# Patient Record
Sex: MALE | HISPANIC OR LATINO | Employment: UNEMPLOYED | URBAN - METROPOLITAN AREA
[De-identification: names, ages, dates, MRNs, and addresses within clinical notes are randomized per-mention and may not be internally consistent; named-entity substitution may affect disease eponyms.]

---

## 2020-08-15 ENCOUNTER — HOSPITAL ENCOUNTER (EMERGENCY)
Facility: HOSPITAL | Age: 60
Discharge: HOME | End: 2020-08-15
Attending: EMERGENCY MEDICINE | Admitting: EMERGENCY MEDICINE

## 2020-08-15 ENCOUNTER — APPOINTMENT (OUTPATIENT)
Dept: GENERAL RADIOLOGY | Facility: HOSPITAL | Age: 60
End: 2020-08-15

## 2020-08-15 VITALS
HEIGHT: 67 IN | OXYGEN SATURATION: 99 % | DIASTOLIC BLOOD PRESSURE: 99 MMHG | RESPIRATION RATE: 18 BRPM | BODY MASS INDEX: 26.68 KG/M2 | TEMPERATURE: 99.1 F | SYSTOLIC BLOOD PRESSURE: 142 MMHG | WEIGHT: 170 LBS | HEART RATE: 85 BPM

## 2020-08-15 DIAGNOSIS — R07.89 RIGHT-SIDED CHEST WALL PAIN: Primary | ICD-10-CM
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
## 2020-08-15 PROCEDURE — 99284 EMERGENCY DEPT VISIT MOD MDM: CPT

## 2020-08-15 PROCEDURE — 71101 X-RAY EXAM UNILAT RIBS/CHEST: CPT

## 2020-08-15 RX ORDER — IBUPROFEN 800 MG/1
800 TABLET ORAL EVERY 8 HOURS PRN
Qty: 30 TABLET | Refills: 0 | Status: SHIPPED | OUTPATIENT
Start: 2020-08-15

## 2020-08-15 RX ORDER — IBUPROFEN 800 MG/1
800 TABLET ORAL ONCE
Status: COMPLETED | OUTPATIENT
Start: 2020-08-15 | End: 2020-08-15

## 2020-08-15 RX ADMIN — IBUPROFEN 800 MG: 800 TABLET, FILM COATED ORAL at 19:24
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
## 2020-08-15 NOTE — ED NOTES
This RN wearing all appropriate PPE including face mask, gloves, and eye protection during any and all periods of contact w/ patient and at all times while in patient care areas; patient wearing mask at all times when staff is present in exam room.     Patricia Leung, RN  08/15/20 1932

## 2020-08-15 NOTE — ED NOTES
Patient was wearing facemask when RN entered the room and throughout our encounter. RN wearing PPE throughout all patient encounter including a face mask, goggles and gloves.      Patricia Walker RN  08/15/20 2030
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
## 2021-09-07 ENCOUNTER — APPOINTMENT (OUTPATIENT)
Dept: GENERAL RADIOLOGY | Facility: HOSPITAL | Age: 61
End: 2021-09-07

## 2021-09-07 ENCOUNTER — APPOINTMENT (OUTPATIENT)
Dept: MRI IMAGING | Facility: HOSPITAL | Age: 61
End: 2021-09-07

## 2021-09-07 ENCOUNTER — HOSPITAL ENCOUNTER (INPATIENT)
Facility: HOSPITAL | Age: 61
LOS: 4 days | Discharge: HOME | End: 2021-09-11
Attending: EMERGENCY MEDICINE | Admitting: INTERNAL MEDICINE

## 2021-09-07 ENCOUNTER — APPOINTMENT (OUTPATIENT)
Dept: CT IMAGING | Facility: HOSPITAL | Age: 61
End: 2021-09-07

## 2021-09-07 DIAGNOSIS — R47.01 APHASIA: Primary | ICD-10-CM

## 2021-09-07 DIAGNOSIS — R90.89 ABNORMAL CT OF BRAIN: ICD-10-CM

## 2021-09-07 PROBLEM — Z87.898 HISTORY OF SEIZURE: Status: ACTIVE | Noted: 2021-09-07

## 2021-09-07 PROBLEM — Z86.73 HISTORY OF STROKE: Status: ACTIVE | Noted: 2021-09-07

## 2021-09-07 PROBLEM — G83.84 TODD'S PARALYSIS: Status: ACTIVE | Noted: 2021-09-07

## 2021-09-07 PROBLEM — R56.9 SEIZURE: Status: ACTIVE | Noted: 2021-09-07

## 2021-09-07 PROBLEM — N17.9 AKI (ACUTE KIDNEY INJURY): Status: ACTIVE | Noted: 2021-09-07

## 2021-09-07 LAB
ABO GROUP BLD: NORMAL
ALBUMIN SERPL-MCNC: 4.4 G/DL (ref 3.5–5.2)
ALBUMIN/GLOB SERPL: 1.4 G/DL
ALP SERPL-CCNC: 80 U/L (ref 39–117)
ALT SERPL W P-5'-P-CCNC: 17 U/L (ref 1–41)
ANION GAP SERPL CALCULATED.3IONS-SCNC: 24.5 MMOL/L (ref 5–15)
APAP SERPL-MCNC: <5 MCG/ML (ref 0–30)
APTT PPP: 27.1 SECONDS (ref 22.7–35.4)
AST SERPL-CCNC: 19 U/L (ref 1–40)
BASOPHILS # BLD AUTO: 0.03 10*3/MM3 (ref 0–0.2)
BASOPHILS NFR BLD AUTO: 0.3 % (ref 0–1.5)
BILIRUB SERPL-MCNC: 0.2 MG/DL (ref 0–1.2)
BLD GP AB SCN SERPL QL: NEGATIVE
BUN SERPL-MCNC: 17 MG/DL (ref 8–23)
BUN/CREAT SERPL: 10.7 (ref 7–25)
CALCIUM SPEC-SCNC: 9.6 MG/DL (ref 8.6–10.5)
CHLORIDE SERPL-SCNC: 95 MMOL/L (ref 98–107)
CO2 SERPL-SCNC: 13.5 MMOL/L (ref 22–29)
CREAT SERPL-MCNC: 1.59 MG/DL (ref 0.76–1.27)
D-LACTATE SERPL-SCNC: 0.5 MMOL/L (ref 0.5–2)
DEPRECATED RDW RBC AUTO: 43.6 FL (ref 37–54)
EOSINOPHIL # BLD AUTO: 0.05 10*3/MM3 (ref 0–0.4)
EOSINOPHIL NFR BLD AUTO: 0.5 % (ref 0.3–6.2)
ERYTHROCYTE [DISTWIDTH] IN BLOOD BY AUTOMATED COUNT: 13.6 % (ref 12.3–15.4)
GFR SERPL CREATININE-BSD FRML MDRD: 45 ML/MIN/1.73
GFR SERPL CREATININE-BSD FRML MDRD: 54 ML/MIN/1.73
GLOBULIN UR ELPH-MCNC: 3.2 GM/DL
GLUCOSE SERPL-MCNC: 119 MG/DL (ref 65–99)
HCT VFR BLD AUTO: 39.4 % (ref 37.5–51)
HGB BLD-MCNC: 13.4 G/DL (ref 13–17.7)
HOLD SPECIMEN: NORMAL
HOLD SPECIMEN: NORMAL
IMM GRANULOCYTES # BLD AUTO: 0.05 10*3/MM3 (ref 0–0.05)
IMM GRANULOCYTES NFR BLD AUTO: 0.5 % (ref 0–0.5)
INR PPP: 1.08 (ref 0.9–1.1)
LYMPHOCYTES # BLD AUTO: 2.64 10*3/MM3 (ref 0.7–3.1)
LYMPHOCYTES NFR BLD AUTO: 26.9 % (ref 19.6–45.3)
MCH RBC QN AUTO: 29.9 PG (ref 26.6–33)
MCHC RBC AUTO-ENTMCNC: 34 G/DL (ref 31.5–35.7)
MCV RBC AUTO: 87.9 FL (ref 79–97)
MONOCYTES # BLD AUTO: 0.73 10*3/MM3 (ref 0.1–0.9)
MONOCYTES NFR BLD AUTO: 7.4 % (ref 5–12)
NEUTROPHILS NFR BLD AUTO: 6.32 10*3/MM3 (ref 1.7–7)
NEUTROPHILS NFR BLD AUTO: 64.4 % (ref 42.7–76)
NRBC BLD AUTO-RTO: 0 /100 WBC (ref 0–0.2)
PLATELET # BLD AUTO: 212 10*3/MM3 (ref 140–450)
PMV BLD AUTO: 10.1 FL (ref 6–12)
POTASSIUM SERPL-SCNC: 3.7 MMOL/L (ref 3.5–5.2)
PROT SERPL-MCNC: 7.6 G/DL (ref 6–8.5)
PROTHROMBIN TIME: 13.8 SECONDS (ref 11.7–14.2)
QT INTERVAL: 353 MS
RBC # BLD AUTO: 4.48 10*6/MM3 (ref 4.14–5.8)
RH BLD: POSITIVE
SALICYLATES SERPL-MCNC: <0.3 MG/DL
SARS-COV-2 RNA RESP QL NAA+PROBE: NOT DETECTED
SODIUM SERPL-SCNC: 133 MMOL/L (ref 136–145)
T&S EXPIRATION DATE: NORMAL
TROPONIN T SERPL-MCNC: <0.01 NG/ML (ref 0–0.03)
WBC # BLD AUTO: 9.82 10*3/MM3 (ref 3.4–10.8)
WHOLE BLOOD HOLD SPECIMEN: NORMAL
WHOLE BLOOD HOLD SPECIMEN: NORMAL

## 2021-09-07 PROCEDURE — 70498 CT ANGIOGRAPHY NECK: CPT

## 2021-09-07 PROCEDURE — 99254 IP/OBS CNSLTJ NEW/EST MOD 60: CPT | Performed by: PSYCHIATRY & NEUROLOGY

## 2021-09-07 PROCEDURE — 85610 PROTHROMBIN TIME: CPT | Performed by: EMERGENCY MEDICINE

## 2021-09-07 PROCEDURE — 70496 CT ANGIOGRAPHY HEAD: CPT

## 2021-09-07 PROCEDURE — 0042T HC CT CEREBRAL PERFUSION W/WO CONTRAST: CPT

## 2021-09-07 PROCEDURE — 4A03X5D MEASUREMENT OF ARTERIAL FLOW, INTRACRANIAL, EXTERNAL APPROACH: ICD-10-PCS | Performed by: EMERGENCY MEDICINE

## 2021-09-07 PROCEDURE — 85025 COMPLETE CBC W/AUTO DIFF WBC: CPT | Performed by: EMERGENCY MEDICINE

## 2021-09-07 PROCEDURE — 80143 DRUG ASSAY ACETAMINOPHEN: CPT | Performed by: EMERGENCY MEDICINE

## 2021-09-07 PROCEDURE — 86900 BLOOD TYPING SEROLOGIC ABO: CPT | Performed by: EMERGENCY MEDICINE

## 2021-09-07 PROCEDURE — 85730 THROMBOPLASTIN TIME PARTIAL: CPT | Performed by: EMERGENCY MEDICINE

## 2021-09-07 PROCEDURE — 84484 ASSAY OF TROPONIN QUANT: CPT | Performed by: EMERGENCY MEDICINE

## 2021-09-07 PROCEDURE — 82565 ASSAY OF CREATININE: CPT

## 2021-09-07 PROCEDURE — 25010000002 THIAMINE PER 100 MG: Performed by: PSYCHIATRY & NEUROLOGY

## 2021-09-07 PROCEDURE — 0 IOPAMIDOL PER 1 ML: Performed by: EMERGENCY MEDICINE

## 2021-09-07 PROCEDURE — 70553 MRI BRAIN STEM W/O & W/DYE: CPT

## 2021-09-07 PROCEDURE — A9577 INJ MULTIHANCE: HCPCS | Performed by: INTERNAL MEDICINE

## 2021-09-07 PROCEDURE — 86850 RBC ANTIBODY SCREEN: CPT | Performed by: EMERGENCY MEDICINE

## 2021-09-07 PROCEDURE — 93005 ELECTROCARDIOGRAM TRACING: CPT | Performed by: EMERGENCY MEDICINE

## 2021-09-07 PROCEDURE — 25010000003 LEVETIRACETAM IN NACL 0.75% 1000 MG/100ML SOLUTION: Performed by: EMERGENCY MEDICINE

## 2021-09-07 PROCEDURE — 86901 BLOOD TYPING SEROLOGIC RH(D): CPT | Performed by: EMERGENCY MEDICINE

## 2021-09-07 PROCEDURE — 83605 ASSAY OF LACTIC ACID: CPT | Performed by: EMERGENCY MEDICINE

## 2021-09-07 PROCEDURE — 0 GADOBENATE DIMEGLUMINE 529 MG/ML SOLUTION: Performed by: INTERNAL MEDICINE

## 2021-09-07 PROCEDURE — 80053 COMPREHEN METABOLIC PANEL: CPT | Performed by: EMERGENCY MEDICINE

## 2021-09-07 PROCEDURE — 25010000002 LORAZEPAM PER 2 MG: Performed by: EMERGENCY MEDICINE

## 2021-09-07 PROCEDURE — U0003 INFECTIOUS AGENT DETECTION BY NUCLEIC ACID (DNA OR RNA); SEVERE ACUTE RESPIRATORY SYNDROME CORONAVIRUS 2 (SARS-COV-2) (CORONAVIRUS DISEASE [COVID-19]), AMPLIFIED PROBE TECHNIQUE, MAKING USE OF HIGH THROUGHPUT TECHNOLOGIES AS DESCRIBED BY CMS-2020-01-R: HCPCS | Performed by: EMERGENCY MEDICINE

## 2021-09-07 PROCEDURE — 99285 EMERGENCY DEPT VISIT HI MDM: CPT

## 2021-09-07 PROCEDURE — 80179 DRUG ASSAY SALICYLATE: CPT | Performed by: EMERGENCY MEDICINE

## 2021-09-07 PROCEDURE — 71045 X-RAY EXAM CHEST 1 VIEW: CPT

## 2021-09-07 PROCEDURE — 93010 ELECTROCARDIOGRAM REPORT: CPT | Performed by: INTERNAL MEDICINE

## 2021-09-07 RX ORDER — ATORVASTATIN CALCIUM 80 MG/1
80 TABLET, FILM COATED ORAL NIGHTLY
Status: DISCONTINUED | OUTPATIENT
Start: 2021-09-07 | End: 2021-09-10

## 2021-09-07 RX ORDER — SODIUM CHLORIDE 0.9 % (FLUSH) 0.9 %
10 SYRINGE (ML) INJECTION EVERY 12 HOURS SCHEDULED
Status: DISCONTINUED | OUTPATIENT
Start: 2021-09-07 | End: 2021-09-11 | Stop reason: HOSPADM

## 2021-09-07 RX ORDER — ACETAMINOPHEN 160 MG/5ML
650 SOLUTION ORAL EVERY 4 HOURS PRN
Status: DISCONTINUED | OUTPATIENT
Start: 2021-09-07 | End: 2021-09-11 | Stop reason: HOSPADM

## 2021-09-07 RX ORDER — ACETAMINOPHEN 650 MG/1
650 SUPPOSITORY RECTAL EVERY 4 HOURS PRN
Status: DISCONTINUED | OUTPATIENT
Start: 2021-09-07 | End: 2021-09-11 | Stop reason: HOSPADM

## 2021-09-07 RX ORDER — LEVETIRACETAM 10 MG/ML
1000 INJECTION INTRAVASCULAR EVERY 12 HOURS SCHEDULED
Status: DISCONTINUED | OUTPATIENT
Start: 2021-09-08 | End: 2021-09-09 | Stop reason: SDUPTHER

## 2021-09-07 RX ORDER — SODIUM CHLORIDE 0.9 % (FLUSH) 0.9 %
10 SYRINGE (ML) INJECTION AS NEEDED
Status: DISCONTINUED | OUTPATIENT
Start: 2021-09-07 | End: 2021-09-11 | Stop reason: HOSPADM

## 2021-09-07 RX ORDER — ONDANSETRON 2 MG/ML
4 INJECTION INTRAMUSCULAR; INTRAVENOUS EVERY 6 HOURS PRN
Status: DISCONTINUED | OUTPATIENT
Start: 2021-09-07 | End: 2021-09-11 | Stop reason: HOSPADM

## 2021-09-07 RX ORDER — NITROGLYCERIN 0.4 MG/1
0.4 TABLET SUBLINGUAL
Status: DISCONTINUED | OUTPATIENT
Start: 2021-09-07 | End: 2021-09-11 | Stop reason: HOSPADM

## 2021-09-07 RX ORDER — LORAZEPAM 2 MG/ML
1 INJECTION INTRAMUSCULAR ONCE
Status: COMPLETED | OUTPATIENT
Start: 2021-09-07 | End: 2021-09-07

## 2021-09-07 RX ORDER — LEVETIRACETAM 10 MG/ML
1000 INJECTION INTRAVASCULAR EVERY 12 HOURS SCHEDULED
Status: DISCONTINUED | OUTPATIENT
Start: 2021-09-07 | End: 2021-09-10

## 2021-09-07 RX ORDER — SODIUM CHLORIDE 9 MG/ML
100 INJECTION, SOLUTION INTRAVENOUS CONTINUOUS
Status: DISCONTINUED | OUTPATIENT
Start: 2021-09-07 | End: 2021-09-10

## 2021-09-07 RX ORDER — LEVETIRACETAM 10 MG/ML
1000 INJECTION INTRAVASCULAR ONCE
Status: COMPLETED | OUTPATIENT
Start: 2021-09-07 | End: 2021-09-07

## 2021-09-07 RX ORDER — ACETAMINOPHEN 325 MG/1
650 TABLET ORAL EVERY 4 HOURS PRN
Status: DISCONTINUED | OUTPATIENT
Start: 2021-09-07 | End: 2021-09-11 | Stop reason: HOSPADM

## 2021-09-07 RX ADMIN — IOPAMIDOL 100 ML: 755 INJECTION, SOLUTION INTRAVENOUS at 16:28

## 2021-09-07 RX ADMIN — IOPAMIDOL 50 ML: 755 INJECTION, SOLUTION INTRAVENOUS at 16:27

## 2021-09-07 RX ADMIN — SODIUM CHLORIDE, PRESERVATIVE FREE 10 ML: 5 INJECTION INTRAVENOUS at 23:13

## 2021-09-07 RX ADMIN — THIAMINE HYDROCHLORIDE 100 MG: 100 INJECTION, SOLUTION INTRAMUSCULAR; INTRAVENOUS at 23:13

## 2021-09-07 RX ADMIN — SODIUM CHLORIDE, PRESERVATIVE FREE 10 ML: 5 INJECTION INTRAVENOUS at 23:14

## 2021-09-07 RX ADMIN — SODIUM CHLORIDE 100 ML/HR: 9 INJECTION, SOLUTION INTRAVENOUS at 23:12

## 2021-09-07 RX ADMIN — SODIUM CHLORIDE 1000 ML: 9 INJECTION, SOLUTION INTRAVENOUS at 17:04

## 2021-09-07 RX ADMIN — LORAZEPAM 1 MG: 2 INJECTION INTRAMUSCULAR; INTRAVENOUS at 17:03

## 2021-09-07 RX ADMIN — GADOBENATE DIMEGLUMINE 17 ML: 529 INJECTION, SOLUTION INTRAVENOUS at 21:38

## 2021-09-07 RX ADMIN — LEVETIRACETAM 1000 MG: 1000 INJECTION, SOLUTION INTRAVENOUS at 17:03

## 2021-09-08 LAB
ALBUMIN SERPL-MCNC: 3.6 G/DL (ref 3.5–5.2)
ALBUMIN/GLOB SERPL: 1.3 G/DL
ALP SERPL-CCNC: 67 U/L (ref 39–117)
ALT SERPL W P-5'-P-CCNC: 13 U/L (ref 1–41)
ANION GAP SERPL CALCULATED.3IONS-SCNC: 9.9 MMOL/L (ref 5–15)
AST SERPL-CCNC: 16 U/L (ref 1–40)
BASOPHILS # BLD AUTO: 0.02 10*3/MM3 (ref 0–0.2)
BASOPHILS NFR BLD AUTO: 0.3 % (ref 0–1.5)
BILIRUB SERPL-MCNC: 0.4 MG/DL (ref 0–1.2)
BUN SERPL-MCNC: 15 MG/DL (ref 8–23)
BUN/CREAT SERPL: 13 (ref 7–25)
CALCIUM SPEC-SCNC: 8.4 MG/DL (ref 8.6–10.5)
CHLORIDE SERPL-SCNC: 105 MMOL/L (ref 98–107)
CHOLEST SERPL-MCNC: 179 MG/DL (ref 0–200)
CO2 SERPL-SCNC: 23.1 MMOL/L (ref 22–29)
CREAT SERPL-MCNC: 1.15 MG/DL (ref 0.76–1.27)
CRP SERPL-MCNC: 0.48 MG/DL (ref 0–0.5)
D-LACTATE SERPL-SCNC: 1 MMOL/L (ref 0.5–2)
DEPRECATED RDW RBC AUTO: 46.2 FL (ref 37–54)
EOSINOPHIL # BLD AUTO: 0.05 10*3/MM3 (ref 0–0.4)
EOSINOPHIL NFR BLD AUTO: 0.9 % (ref 0.3–6.2)
ERYTHROCYTE [DISTWIDTH] IN BLOOD BY AUTOMATED COUNT: 13.8 % (ref 12.3–15.4)
GFR SERPL CREATININE-BSD FRML MDRD: 65 ML/MIN/1.73
GFR SERPL CREATININE-BSD FRML MDRD: 79 ML/MIN/1.73
GLOBULIN UR ELPH-MCNC: 2.8 GM/DL
GLUCOSE BLDC GLUCOMTR-MCNC: 126 MG/DL (ref 70–130)
GLUCOSE BLDC GLUCOMTR-MCNC: 152 MG/DL (ref 70–130)
GLUCOSE BLDC GLUCOMTR-MCNC: 96 MG/DL (ref 70–130)
GLUCOSE BLDC GLUCOMTR-MCNC: 99 MG/DL (ref 70–130)
GLUCOSE SERPL-MCNC: 85 MG/DL (ref 65–99)
HBA1C MFR BLD: 5.7 % (ref 4.8–5.6)
HCT VFR BLD AUTO: 38.7 % (ref 37.5–51)
HDLC SERPL-MCNC: 28 MG/DL (ref 40–60)
HGB BLD-MCNC: 12.4 G/DL (ref 13–17.7)
IMM GRANULOCYTES # BLD AUTO: 0.01 10*3/MM3 (ref 0–0.05)
IMM GRANULOCYTES NFR BLD AUTO: 0.2 % (ref 0–0.5)
LDLC SERPL CALC-MCNC: 118 MG/DL (ref 0–100)
LDLC/HDLC SERPL: 4.06 {RATIO}
LYMPHOCYTES # BLD AUTO: 1.17 10*3/MM3 (ref 0.7–3.1)
LYMPHOCYTES NFR BLD AUTO: 20.1 % (ref 19.6–45.3)
MCH RBC QN AUTO: 29 PG (ref 26.6–33)
MCHC RBC AUTO-ENTMCNC: 32 G/DL (ref 31.5–35.7)
MCV RBC AUTO: 90.4 FL (ref 79–97)
MONOCYTES # BLD AUTO: 0.46 10*3/MM3 (ref 0.1–0.9)
MONOCYTES NFR BLD AUTO: 7.9 % (ref 5–12)
NEUTROPHILS NFR BLD AUTO: 4.11 10*3/MM3 (ref 1.7–7)
NEUTROPHILS NFR BLD AUTO: 70.6 % (ref 42.7–76)
NRBC BLD AUTO-RTO: 0 /100 WBC (ref 0–0.2)
PLATELET # BLD AUTO: 189 10*3/MM3 (ref 140–450)
PMV BLD AUTO: 10.3 FL (ref 6–12)
POTASSIUM SERPL-SCNC: 3.8 MMOL/L (ref 3.5–5.2)
PROT SERPL-MCNC: 6.4 G/DL (ref 6–8.5)
RBC # BLD AUTO: 4.28 10*6/MM3 (ref 4.14–5.8)
SODIUM SERPL-SCNC: 138 MMOL/L (ref 136–145)
TRIGL SERPL-MCNC: 186 MG/DL (ref 0–150)
VLDLC SERPL-MCNC: 33 MG/DL (ref 5–40)
WBC # BLD AUTO: 5.82 10*3/MM3 (ref 3.4–10.8)

## 2021-09-08 PROCEDURE — 97110 THERAPEUTIC EXERCISES: CPT

## 2021-09-08 PROCEDURE — 97161 PT EVAL LOW COMPLEX 20 MIN: CPT

## 2021-09-08 PROCEDURE — 97165 OT EVAL LOW COMPLEX 30 MIN: CPT

## 2021-09-08 PROCEDURE — 25010000003 LEVETIRACETAM IN NACL 0.75% 1000 MG/100ML SOLUTION: Performed by: INTERNAL MEDICINE

## 2021-09-08 PROCEDURE — 36415 COLL VENOUS BLD VENIPUNCTURE: CPT | Performed by: INTERNAL MEDICINE

## 2021-09-08 PROCEDURE — 25010000002 THIAMINE PER 100 MG: Performed by: PSYCHIATRY & NEUROLOGY

## 2021-09-08 PROCEDURE — 92523 SPEECH SOUND LANG COMPREHEN: CPT

## 2021-09-08 PROCEDURE — 99254 IP/OBS CNSLTJ NEW/EST MOD 60: CPT | Performed by: NURSE PRACTITIONER

## 2021-09-08 PROCEDURE — 99233 SBSQ HOSP IP/OBS HIGH 50: CPT | Performed by: NURSE PRACTITIONER

## 2021-09-08 PROCEDURE — 85025 COMPLETE CBC W/AUTO DIFF WBC: CPT | Performed by: INTERNAL MEDICINE

## 2021-09-08 PROCEDURE — 80061 LIPID PANEL: CPT | Performed by: PSYCHIATRY & NEUROLOGY

## 2021-09-08 PROCEDURE — 82962 GLUCOSE BLOOD TEST: CPT

## 2021-09-08 PROCEDURE — 83605 ASSAY OF LACTIC ACID: CPT | Performed by: INTERNAL MEDICINE

## 2021-09-08 PROCEDURE — 97535 SELF CARE MNGMENT TRAINING: CPT

## 2021-09-08 PROCEDURE — 87040 BLOOD CULTURE FOR BACTERIA: CPT | Performed by: NURSE PRACTITIONER

## 2021-09-08 PROCEDURE — 80053 COMPREHEN METABOLIC PANEL: CPT | Performed by: INTERNAL MEDICINE

## 2021-09-08 PROCEDURE — 25010000003 LEVETIRACETAM IN NACL 0.75% 1000 MG/100ML SOLUTION: Performed by: PSYCHIATRY & NEUROLOGY

## 2021-09-08 PROCEDURE — 83036 HEMOGLOBIN GLYCOSYLATED A1C: CPT | Performed by: PSYCHIATRY & NEUROLOGY

## 2021-09-08 PROCEDURE — 86140 C-REACTIVE PROTEIN: CPT | Performed by: NURSE PRACTITIONER

## 2021-09-08 RX ADMIN — SODIUM CHLORIDE, PRESERVATIVE FREE 10 ML: 5 INJECTION INTRAVENOUS at 20:42

## 2021-09-08 RX ADMIN — SODIUM CHLORIDE, PRESERVATIVE FREE 10 ML: 5 INJECTION INTRAVENOUS at 20:41

## 2021-09-08 RX ADMIN — ATORVASTATIN CALCIUM 80 MG: 80 TABLET, FILM COATED ORAL at 20:29

## 2021-09-08 RX ADMIN — ACETAMINOPHEN 650 MG: 325 TABLET, FILM COATED ORAL at 20:29

## 2021-09-08 RX ADMIN — SODIUM CHLORIDE, PRESERVATIVE FREE 10 ML: 5 INJECTION INTRAVENOUS at 09:53

## 2021-09-08 RX ADMIN — LEVETIRACETAM 1000 MG: 1000 INJECTION, SOLUTION INTRAVENOUS at 04:27

## 2021-09-08 RX ADMIN — LEVETIRACETAM 1000 MG: 1000 INJECTION, SOLUTION INTRAVENOUS at 20:29

## 2021-09-08 RX ADMIN — ATORVASTATIN CALCIUM 80 MG: 80 TABLET, FILM COATED ORAL at 00:26

## 2021-09-08 RX ADMIN — LEVETIRACETAM 1000 MG: 1000 INJECTION, SOLUTION INTRAVENOUS at 17:38

## 2021-09-08 RX ADMIN — SODIUM CHLORIDE 100 ML/HR: 9 INJECTION, SOLUTION INTRAVENOUS at 19:30

## 2021-09-08 RX ADMIN — THIAMINE HYDROCHLORIDE 100 MG: 100 INJECTION, SOLUTION INTRAMUSCULAR; INTRAVENOUS at 09:52

## 2021-09-09 ENCOUNTER — APPOINTMENT (OUTPATIENT)
Dept: CT IMAGING | Facility: HOSPITAL | Age: 61
End: 2021-09-09

## 2021-09-09 ENCOUNTER — APPOINTMENT (OUTPATIENT)
Dept: NEUROLOGY | Facility: HOSPITAL | Age: 61
End: 2021-09-09

## 2021-09-09 PROBLEM — N17.9 AKI (ACUTE KIDNEY INJURY): Status: RESOLVED | Noted: 2021-09-07 | Resolved: 2021-09-09

## 2021-09-09 LAB
ANION GAP SERPL CALCULATED.3IONS-SCNC: 9.6 MMOL/L (ref 5–15)
BASOPHILS # BLD AUTO: 0.03 10*3/MM3 (ref 0–0.2)
BASOPHILS NFR BLD AUTO: 0.6 % (ref 0–1.5)
BUN SERPL-MCNC: 15 MG/DL (ref 8–23)
BUN/CREAT SERPL: 12.5 (ref 7–25)
CALCIUM SPEC-SCNC: 8.1 MG/DL (ref 8.6–10.5)
CHLORIDE SERPL-SCNC: 107 MMOL/L (ref 98–107)
CO2 SERPL-SCNC: 22.4 MMOL/L (ref 22–29)
CREAT SERPL-MCNC: 1.2 MG/DL (ref 0.76–1.27)
DEPRECATED RDW RBC AUTO: 42.5 FL (ref 37–54)
EOSINOPHIL # BLD AUTO: 0.07 10*3/MM3 (ref 0–0.4)
EOSINOPHIL NFR BLD AUTO: 1.3 % (ref 0.3–6.2)
ERYTHROCYTE [DISTWIDTH] IN BLOOD BY AUTOMATED COUNT: 13.1 % (ref 12.3–15.4)
GFR SERPL CREATININE-BSD FRML MDRD: 62 ML/MIN/1.73
GFR SERPL CREATININE-BSD FRML MDRD: 75 ML/MIN/1.73
GLUCOSE BLDC GLUCOMTR-MCNC: 101 MG/DL (ref 70–130)
GLUCOSE BLDC GLUCOMTR-MCNC: 115 MG/DL (ref 70–130)
GLUCOSE BLDC GLUCOMTR-MCNC: 128 MG/DL (ref 70–130)
GLUCOSE BLDC GLUCOMTR-MCNC: 143 MG/DL (ref 70–130)
GLUCOSE SERPL-MCNC: 118 MG/DL (ref 65–99)
HCT VFR BLD AUTO: 35.4 % (ref 37.5–51)
HGB BLD-MCNC: 11.8 G/DL (ref 13–17.7)
IMM GRANULOCYTES # BLD AUTO: 0.02 10*3/MM3 (ref 0–0.05)
IMM GRANULOCYTES NFR BLD AUTO: 0.4 % (ref 0–0.5)
LYMPHOCYTES # BLD AUTO: 1.57 10*3/MM3 (ref 0.7–3.1)
LYMPHOCYTES NFR BLD AUTO: 30.2 % (ref 19.6–45.3)
MCH RBC QN AUTO: 29.6 PG (ref 26.6–33)
MCHC RBC AUTO-ENTMCNC: 33.3 G/DL (ref 31.5–35.7)
MCV RBC AUTO: 88.7 FL (ref 79–97)
MONOCYTES # BLD AUTO: 0.45 10*3/MM3 (ref 0.1–0.9)
MONOCYTES NFR BLD AUTO: 8.7 % (ref 5–12)
NEUTROPHILS NFR BLD AUTO: 3.06 10*3/MM3 (ref 1.7–7)
NEUTROPHILS NFR BLD AUTO: 58.8 % (ref 42.7–76)
NRBC BLD AUTO-RTO: 0 /100 WBC (ref 0–0.2)
PLATELET # BLD AUTO: 184 10*3/MM3 (ref 140–450)
PMV BLD AUTO: 10.1 FL (ref 6–12)
POTASSIUM SERPL-SCNC: 3.6 MMOL/L (ref 3.5–5.2)
RBC # BLD AUTO: 3.99 10*6/MM3 (ref 4.14–5.8)
SODIUM SERPL-SCNC: 139 MMOL/L (ref 136–145)
WBC # BLD AUTO: 5.2 10*3/MM3 (ref 3.4–10.8)

## 2021-09-09 PROCEDURE — 85025 COMPLETE CBC W/AUTO DIFF WBC: CPT | Performed by: STUDENT IN AN ORGANIZED HEALTH CARE EDUCATION/TRAINING PROGRAM

## 2021-09-09 PROCEDURE — 25010000002 IOPAMIDOL 61 % SOLUTION: Performed by: HOSPITALIST

## 2021-09-09 PROCEDURE — 95819 EEG AWAKE AND ASLEEP: CPT | Performed by: PSYCHIATRY & NEUROLOGY

## 2021-09-09 PROCEDURE — 80048 BASIC METABOLIC PNL TOTAL CA: CPT | Performed by: STUDENT IN AN ORGANIZED HEALTH CARE EDUCATION/TRAINING PROGRAM

## 2021-09-09 PROCEDURE — 95819 EEG AWAKE AND ASLEEP: CPT

## 2021-09-09 PROCEDURE — 25010000002 THIAMINE PER 100 MG: Performed by: PSYCHIATRY & NEUROLOGY

## 2021-09-09 PROCEDURE — 99233 SBSQ HOSP IP/OBS HIGH 50: CPT | Performed by: NURSE PRACTITIONER

## 2021-09-09 PROCEDURE — 74177 CT ABD & PELVIS W/CONTRAST: CPT

## 2021-09-09 PROCEDURE — 97535 SELF CARE MNGMENT TRAINING: CPT | Performed by: OCCUPATIONAL THERAPIST

## 2021-09-09 PROCEDURE — 0 DIATRIZOATE MEGLUMINE & SODIUM PER 1 ML: Performed by: HOSPITALIST

## 2021-09-09 PROCEDURE — 25010000003 LEVETIRACETAM IN NACL 0.75% 1000 MG/100ML SOLUTION: Performed by: PSYCHIATRY & NEUROLOGY

## 2021-09-09 PROCEDURE — 71260 CT THORAX DX C+: CPT

## 2021-09-09 PROCEDURE — 82962 GLUCOSE BLOOD TEST: CPT

## 2021-09-09 RX ORDER — IBUPROFEN 600 MG/1
600 TABLET ORAL ONCE
Status: COMPLETED | OUTPATIENT
Start: 2021-09-09 | End: 2021-09-09

## 2021-09-09 RX ADMIN — THIAMINE HYDROCHLORIDE 100 MG: 100 INJECTION, SOLUTION INTRAMUSCULAR; INTRAVENOUS at 12:21

## 2021-09-09 RX ADMIN — LEVETIRACETAM 1000 MG: 1000 INJECTION, SOLUTION INTRAVENOUS at 05:43

## 2021-09-09 RX ADMIN — ACETAMINOPHEN 650 MG: 325 TABLET, FILM COATED ORAL at 12:34

## 2021-09-09 RX ADMIN — IOPAMIDOL 85 ML: 612 INJECTION, SOLUTION INTRAVENOUS at 15:04

## 2021-09-09 RX ADMIN — IBUPROFEN 600 MG: 600 TABLET, FILM COATED ORAL at 17:54

## 2021-09-09 RX ADMIN — DIATRIZOATE MEGLUMINE AND DIATRIZOATE SODIUM 30 ML: 600; 100 SOLUTION ORAL; RECTAL at 12:21

## 2021-09-09 RX ADMIN — SODIUM CHLORIDE 100 ML/HR: 9 INJECTION, SOLUTION INTRAVENOUS at 05:43

## 2021-09-09 RX ADMIN — LEVETIRACETAM 1000 MG: 1000 INJECTION, SOLUTION INTRAVENOUS at 17:54

## 2021-09-09 RX ADMIN — SODIUM CHLORIDE, PRESERVATIVE FREE 10 ML: 5 INJECTION INTRAVENOUS at 21:05

## 2021-09-09 RX ADMIN — SODIUM CHLORIDE 100 ML/HR: 9 INJECTION, SOLUTION INTRAVENOUS at 23:08

## 2021-09-09 RX ADMIN — ATORVASTATIN CALCIUM 80 MG: 80 TABLET, FILM COATED ORAL at 21:05

## 2021-09-10 ENCOUNTER — TELEPHONE (OUTPATIENT)
Dept: NEUROSURGERY | Facility: CLINIC | Age: 61
End: 2021-09-10

## 2021-09-10 ENCOUNTER — APPOINTMENT (OUTPATIENT)
Dept: CARDIOLOGY | Facility: HOSPITAL | Age: 61
End: 2021-09-10

## 2021-09-10 DIAGNOSIS — Q28.3 CAVERNOUS MALFORMATION: Primary | ICD-10-CM

## 2021-09-10 PROBLEM — M10.9 ACUTE GOUT OF LEFT HAND: Status: ACTIVE | Noted: 2021-09-10

## 2021-09-10 LAB
AORTIC DIMENSIONLESS INDEX: 0.8 (DI)
BH CV ECHO MEAS - AO MAX PG (FULL): 2.3 MMHG
BH CV ECHO MEAS - AO MAX PG: 5.8 MMHG
BH CV ECHO MEAS - AO MEAN PG (FULL): 1 MMHG
BH CV ECHO MEAS - AO MEAN PG: 3 MMHG
BH CV ECHO MEAS - AO ROOT AREA (BSA CORRECTED): 1.5
BH CV ECHO MEAS - AO ROOT AREA: 7.1 CM^2
BH CV ECHO MEAS - AO ROOT DIAM: 3 CM
BH CV ECHO MEAS - AO V2 MAX: 120 CM/SEC
BH CV ECHO MEAS - AO V2 MEAN: 83.8 CM/SEC
BH CV ECHO MEAS - AO V2 VTI: 21.7 CM
BH CV ECHO MEAS - ASC AORTA: 3.4 CM
BH CV ECHO MEAS - AVA(I,A): 2.8 CM^2
BH CV ECHO MEAS - AVA(I,D): 2.8 CM^2
BH CV ECHO MEAS - AVA(V,A): 2.7 CM^2
BH CV ECHO MEAS - AVA(V,D): 2.7 CM^2
BH CV ECHO MEAS - BSA(HAYCOCK): 2.1 M^2
BH CV ECHO MEAS - BSA: 2 M^2
BH CV ECHO MEAS - BZI_BMI: 30.1 KILOGRAMS/M^2
BH CV ECHO MEAS - BZI_METRIC_HEIGHT: 170.2 CM
BH CV ECHO MEAS - BZI_METRIC_WEIGHT: 87.1 KG
BH CV ECHO MEAS - EDV(CUBED): 132.7 ML
BH CV ECHO MEAS - EDV(MOD-SP2): 85 ML
BH CV ECHO MEAS - EDV(MOD-SP4): 88 ML
BH CV ECHO MEAS - EDV(TEICH): 123.8 ML
BH CV ECHO MEAS - EF(CUBED): 72.9 %
BH CV ECHO MEAS - EF(MOD-BP): 59.8 %
BH CV ECHO MEAS - EF(MOD-SP2): 56.5 %
BH CV ECHO MEAS - EF(MOD-SP4): 61.4 %
BH CV ECHO MEAS - EF(TEICH): 64.4 %
BH CV ECHO MEAS - ESV(CUBED): 35.9 ML
BH CV ECHO MEAS - ESV(MOD-SP2): 37 ML
BH CV ECHO MEAS - ESV(MOD-SP4): 34 ML
BH CV ECHO MEAS - ESV(TEICH): 44.1 ML
BH CV ECHO MEAS - FS: 35.3 %
BH CV ECHO MEAS - IVS/LVPW: 0.91
BH CV ECHO MEAS - IVSD: 1 CM
BH CV ECHO MEAS - LAT PEAK E' VEL: 10.4 CM/SEC
BH CV ECHO MEAS - LV DIASTOLIC VOL/BSA (35-75): 44.3 ML/M^2
BH CV ECHO MEAS - LV MASS(C)D: 200.8 GRAMS
BH CV ECHO MEAS - LV MASS(C)DI: 101 GRAMS/M^2
BH CV ECHO MEAS - LV MAX PG: 3.5 MMHG
BH CV ECHO MEAS - LV MEAN PG: 2 MMHG
BH CV ECHO MEAS - LV SYSTOLIC VOL/BSA (12-30): 17.1 ML/M^2
BH CV ECHO MEAS - LV V1 MAX: 93.3 CM/SEC
BH CV ECHO MEAS - LV V1 MEAN: 67.8 CM/SEC
BH CV ECHO MEAS - LV V1 VTI: 17.8 CM
BH CV ECHO MEAS - LVIDD: 5.1 CM
BH CV ECHO MEAS - LVIDS: 3.3 CM
BH CV ECHO MEAS - LVLD AP2: 7.3 CM
BH CV ECHO MEAS - LVLD AP4: 7.5 CM
BH CV ECHO MEAS - LVLS AP2: 6.5 CM
BH CV ECHO MEAS - LVLS AP4: 6.6 CM
BH CV ECHO MEAS - LVOT AREA (M): 3.5 CM^2
BH CV ECHO MEAS - LVOT AREA: 3.5 CM^2
BH CV ECHO MEAS - LVOT DIAM: 2.1 CM
BH CV ECHO MEAS - LVPWD: 1.1 CM
BH CV ECHO MEAS - MED PEAK E' VEL: 5.7 CM/SEC
BH CV ECHO MEAS - MV A DUR: 0.1 SEC
BH CV ECHO MEAS - MV A MAX VEL: 74.6 CM/SEC
BH CV ECHO MEAS - MV DEC SLOPE: 504.5 CM/SEC^2
BH CV ECHO MEAS - MV DEC TIME: 166 SEC
BH CV ECHO MEAS - MV E MAX VEL: 75.8 CM/SEC
BH CV ECHO MEAS - MV E/A: 1
BH CV ECHO MEAS - MV MAX PG: 3.6 MMHG
BH CV ECHO MEAS - MV MEAN PG: 2 MMHG
BH CV ECHO MEAS - MV P1/2T MAX VEL: 88.9 CM/SEC
BH CV ECHO MEAS - MV P1/2T: 51.6 MSEC
BH CV ECHO MEAS - MV V2 MAX: 95.4 CM/SEC
BH CV ECHO MEAS - MV V2 MEAN: 58.4 CM/SEC
BH CV ECHO MEAS - MV V2 VTI: 25.8 CM
BH CV ECHO MEAS - MVA P1/2T LCG: 2.5 CM^2
BH CV ECHO MEAS - MVA(P1/2T): 4.3 CM^2
BH CV ECHO MEAS - MVA(VTI): 2.4 CM^2
BH CV ECHO MEAS - PA ACC TIME: 0.14 SEC
BH CV ECHO MEAS - PA MAX PG (FULL): 0.94 MMHG
BH CV ECHO MEAS - PA MAX PG: 2.3 MMHG
BH CV ECHO MEAS - PA PR(ACCEL): 18.3 MMHG
BH CV ECHO MEAS - PA V2 MAX: 76.6 CM/SEC
BH CV ECHO MEAS - RAP SYSTOLE: 8 MMHG
BH CV ECHO MEAS - RV MAX PG: 1.4 MMHG
BH CV ECHO MEAS - RV MEAN PG: 1 MMHG
BH CV ECHO MEAS - RV V1 MAX: 59.4 CM/SEC
BH CV ECHO MEAS - RV V1 MEAN: 44 CM/SEC
BH CV ECHO MEAS - RV V1 VTI: 12.1 CM
BH CV ECHO MEAS - SI(AO): 77.2 ML/M^2
BH CV ECHO MEAS - SI(CUBED): 48.7 ML/M^2
BH CV ECHO MEAS - SI(LVOT): 31 ML/M^2
BH CV ECHO MEAS - SI(MOD-SP2): 24.2 ML/M^2
BH CV ECHO MEAS - SI(MOD-SP4): 27.2 ML/M^2
BH CV ECHO MEAS - SI(TEICH): 40.1 ML/M^2
BH CV ECHO MEAS - SV(AO): 153.4 ML
BH CV ECHO MEAS - SV(CUBED): 96.7 ML
BH CV ECHO MEAS - SV(LVOT): 61.7 ML
BH CV ECHO MEAS - SV(MOD-SP2): 48 ML
BH CV ECHO MEAS - SV(MOD-SP4): 54 ML
BH CV ECHO MEAS - SV(TEICH): 79.7 ML
BH CV ECHO MEAS - TAPSE (>1.6): 2.4 CM
BH CV ECHO MEASUREMENTS AVERAGE E/E' RATIO: 9.42
BH CV XLRA - RV BASE: 3.3 CM
BH CV XLRA - RV LENGTH: 8.1 CM
BH CV XLRA - RV MID: 1.5 CM
BH CV XLRA - TDI S': 14.6 CM/SEC
GLUCOSE BLDC GLUCOMTR-MCNC: 104 MG/DL (ref 70–130)
GLUCOSE BLDC GLUCOMTR-MCNC: 105 MG/DL (ref 70–130)
GLUCOSE BLDC GLUCOMTR-MCNC: 99 MG/DL (ref 70–130)
LEFT ATRIUM VOLUME INDEX: 20 ML/M2
URATE SERPL-MCNC: 7.1 MG/DL (ref 3.4–7)

## 2021-09-10 PROCEDURE — 99233 SBSQ HOSP IP/OBS HIGH 50: CPT | Performed by: NURSE PRACTITIONER

## 2021-09-10 PROCEDURE — 84550 ASSAY OF BLOOD/URIC ACID: CPT | Performed by: NURSE PRACTITIONER

## 2021-09-10 PROCEDURE — 99231 SBSQ HOSP IP/OBS SF/LOW 25: CPT | Performed by: NURSE PRACTITIONER

## 2021-09-10 PROCEDURE — 63710000001 METHYLPREDNISOLONE 4 MG TABLET THERAPY PACK 21 EACH DISP PACK: Performed by: NURSE PRACTITIONER

## 2021-09-10 PROCEDURE — 82962 GLUCOSE BLOOD TEST: CPT

## 2021-09-10 PROCEDURE — 25010000003 LEVETIRACETAM IN NACL 0.75% 1000 MG/100ML SOLUTION: Performed by: PSYCHIATRY & NEUROLOGY

## 2021-09-10 PROCEDURE — 93306 TTE W/DOPPLER COMPLETE: CPT

## 2021-09-10 PROCEDURE — 93306 TTE W/DOPPLER COMPLETE: CPT | Performed by: INTERNAL MEDICINE

## 2021-09-10 PROCEDURE — 25010000002 THIAMINE PER 100 MG: Performed by: PSYCHIATRY & NEUROLOGY

## 2021-09-10 PROCEDURE — 97110 THERAPEUTIC EXERCISES: CPT

## 2021-09-10 RX ORDER — METHYLPREDNISOLONE 4 MG/1
8 TABLET ORAL
Status: DISCONTINUED | OUTPATIENT
Start: 2021-09-11 | End: 2021-09-11 | Stop reason: HOSPADM

## 2021-09-10 RX ORDER — METHYLPREDNISOLONE 4 MG/1
4 TABLET ORAL
Status: DISCONTINUED | OUTPATIENT
Start: 2021-09-13 | End: 2021-09-11 | Stop reason: HOSPADM

## 2021-09-10 RX ORDER — ATORVASTATIN CALCIUM 20 MG/1
40 TABLET, FILM COATED ORAL NIGHTLY
Status: DISCONTINUED | OUTPATIENT
Start: 2021-09-10 | End: 2021-09-11 | Stop reason: HOSPADM

## 2021-09-10 RX ORDER — METHYLPREDNISOLONE 4 MG/1
4 TABLET ORAL
Status: DISCONTINUED | OUTPATIENT
Start: 2021-09-11 | End: 2021-09-11 | Stop reason: HOSPADM

## 2021-09-10 RX ORDER — METHYLPREDNISOLONE 4 MG/1
4 TABLET ORAL
Status: DISCONTINUED | OUTPATIENT
Start: 2021-09-15 | End: 2021-09-11 | Stop reason: HOSPADM

## 2021-09-10 RX ORDER — METHYLPREDNISOLONE 4 MG/1
4 TABLET ORAL
Status: DISCONTINUED | OUTPATIENT
Start: 2021-09-14 | End: 2021-09-11 | Stop reason: HOSPADM

## 2021-09-10 RX ORDER — LEVETIRACETAM 500 MG/1
1000 TABLET ORAL EVERY 12 HOURS SCHEDULED
Status: DISCONTINUED | OUTPATIENT
Start: 2021-09-10 | End: 2021-09-11 | Stop reason: HOSPADM

## 2021-09-10 RX ORDER — METHYLPREDNISOLONE 4 MG/1
24 TABLET ORAL ONCE
Status: COMPLETED | OUTPATIENT
Start: 2021-09-10 | End: 2021-09-10

## 2021-09-10 RX ORDER — METHYLPREDNISOLONE 4 MG/1
4 TABLET ORAL
Status: DISCONTINUED | OUTPATIENT
Start: 2021-09-12 | End: 2021-09-11 | Stop reason: HOSPADM

## 2021-09-10 RX ADMIN — LEVETIRACETAM 1000 MG: 500 TABLET, FILM COATED ORAL at 17:31

## 2021-09-10 RX ADMIN — GLYCERIN, HYPROMELLOSE, POLYETHYLENE GLYCOL 2 DROP: .2; .2; 1 LIQUID OPHTHALMIC at 17:30

## 2021-09-10 RX ADMIN — SODIUM CHLORIDE, PRESERVATIVE FREE 10 ML: 5 INJECTION INTRAVENOUS at 08:09

## 2021-09-10 RX ADMIN — ACETAMINOPHEN 650 MG: 325 TABLET, FILM COATED ORAL at 13:30

## 2021-09-10 RX ADMIN — LEVETIRACETAM 1000 MG: 1000 INJECTION, SOLUTION INTRAVENOUS at 05:34

## 2021-09-10 RX ADMIN — METHYLPREDNISOLONE 24 MG: 4 TABLET ORAL at 17:31

## 2021-09-10 RX ADMIN — ACETAMINOPHEN 650 MG: 325 TABLET, FILM COATED ORAL at 21:09

## 2021-09-10 RX ADMIN — THIAMINE HYDROCHLORIDE 100 MG: 100 INJECTION, SOLUTION INTRAMUSCULAR; INTRAVENOUS at 08:09

## 2021-09-10 RX ADMIN — SODIUM CHLORIDE, PRESERVATIVE FREE 10 ML: 5 INJECTION INTRAVENOUS at 21:11

## 2021-09-10 RX ADMIN — ATORVASTATIN CALCIUM 40 MG: 20 TABLET, FILM COATED ORAL at 21:09

## 2021-09-10 NOTE — TELEPHONE ENCOUNTER
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  Needs follow-up 10-14 days with CT head.  Patient is fully Urdu-speaking.  You can contact his son for the follow-up information. He will need extra time ( needed)

## 2021-09-11 ENCOUNTER — APPOINTMENT (OUTPATIENT)
Dept: CARDIOLOGY | Facility: HOSPITAL | Age: 61
End: 2021-09-11

## 2021-09-11 VITALS
BODY MASS INDEX: 30.13 KG/M2 | HEART RATE: 70 BPM | SYSTOLIC BLOOD PRESSURE: 125 MMHG | HEIGHT: 67 IN | WEIGHT: 192 LBS | TEMPERATURE: 98.7 F | DIASTOLIC BLOOD PRESSURE: 70 MMHG | OXYGEN SATURATION: 93 % | RESPIRATION RATE: 16 BRPM

## 2021-09-11 LAB — GLUCOSE BLDC GLUCOMTR-MCNC: 152 MG/DL (ref 70–130)

## 2021-09-11 PROCEDURE — 82962 GLUCOSE BLOOD TEST: CPT

## 2021-09-11 PROCEDURE — 63710000001 METHYLPREDNISOLONE 4 MG TABLET THERAPY PACK 21 EACH DISP PACK: Performed by: NURSE PRACTITIONER

## 2021-09-11 PROCEDURE — 93246 EXT ECG>7D<15D RECORDING: CPT

## 2021-09-11 RX ORDER — LEVETIRACETAM 1000 MG/1
1000 TABLET ORAL EVERY 12 HOURS SCHEDULED
Qty: 60 TABLET | Refills: 0 | Status: SHIPPED | OUTPATIENT
Start: 2021-09-11 | End: 2021-10-11

## 2021-09-11 RX ORDER — ATORVASTATIN CALCIUM 40 MG/1
40 TABLET, FILM COATED ORAL NIGHTLY
Qty: 30 TABLET | Refills: 0 | Status: SHIPPED | OUTPATIENT
Start: 2021-09-11 | End: 2021-10-11

## 2021-09-11 RX ORDER — PREDNISONE 10 MG/1
10 TABLET ORAL DAILY
Qty: 5 TABLET | Refills: 0 | Status: SHIPPED | OUTPATIENT
Start: 2021-09-11 | End: 2021-09-16

## 2021-09-11 RX ADMIN — SODIUM CHLORIDE, PRESERVATIVE FREE 10 ML: 5 INJECTION INTRAVENOUS at 09:14

## 2021-09-11 RX ADMIN — Medication 100 MG: at 09:14

## 2021-09-11 RX ADMIN — METHYLPREDNISOLONE 4 MG: 4 TABLET ORAL at 12:23

## 2021-09-11 RX ADMIN — METHYLPREDNISOLONE 4 MG: 4 TABLET ORAL at 09:14

## 2021-09-11 RX ADMIN — LEVETIRACETAM 1000 MG: 500 TABLET, FILM COATED ORAL at 09:14

## 2021-09-12 ENCOUNTER — READMISSION MANAGEMENT (OUTPATIENT)
Dept: CALL CENTER | Facility: HOSPITAL | Age: 61
End: 2021-09-12

## 2021-09-12 NOTE — OUTREACH NOTE
Prep Survey      Responses   [facility] facility patient discharged from?  [place]   Is LACE score < 7 ?  No   Emergency Room discharge w/ pulse ox?  No   Eligibility  Readm Mgmt   Discharge diagnosis  Post-ictal aphasia   Does the patient have one of the following disease processes/diagnoses(primary or secondary)?  Other   Does the patient have Home health ordered?  No   Is there a DME ordered?  No   Comments regarding appointments  Appts needed   Medication alerts for this patient  Lipitor and Keppra   General alerts for this patient   needed - Seizure, Nilton's paralysis, HX of stroke   Prep survey completed?  Yes          Jessy Hussein RN

## 2021-09-13 LAB
BACTERIA SPEC AEROBE CULT: NORMAL
BACTERIA SPEC AEROBE CULT: NORMAL

## 2021-09-15 ENCOUNTER — READMISSION MANAGEMENT (OUTPATIENT)
Dept: CALL CENTER | Facility: HOSPITAL | Age: 61
End: 2021-09-15

## 2021-09-15 NOTE — OUTREACH NOTE
Medical Week 1 Survey      Responses   [facility] [place] [facility]   Does the patient have one of the following disease processes/diagnoses(primary or secondary)?  Other   Week 1 attempt successful?  Yes   Call start time  1340   Call end time  1419   General alerts for this patient   needed - Seizure, Nilton's paralysis, HX of stroke   Discharge diagnosis  Post-ictal aphasia   If primary language is not English what is the name and relationship or agency of  used?  [employer], [employer] interpretor # [id number]   Is patient permission given to speak with other caregiver?  No   Meds reviewed with patient/caregiver?  Yes   Is the patient having any side effects they believe may be caused by any medication additions or changes?  No   Does the patient have all medications ordered at discharge?  Yes   Is the patient taking all medications as directed (includes completed medication regime)?  Yes   Medication comments  Extensive review of medications.   Comments regarding appointments  CT cindy head wo contrast sept 24, 2021 9:30 AM, Hospital Follow Up Oumou Mullen, ADONAY sept 24, 2021 2:00 PM     Does the patient have a primary care provider?   Yes   Does the patient have an appointment with their PCP within 7 days of discharge?  No   Comments regarding PCP  Patient reports that he sees a general dr at a [facility], does not remember their name.    Nursing Interventions  Educated patient on importance of making appointment, Advised patient to make appointment   Has the patient kept scheduled appointments due by today?  N/A   Comments  Advised of importance of keeping appts for 9/24/21   Has home health visited the patient within 72 hours of discharge?  N/A   Psychosocial issues?  No   Did the patient receive a copy of their discharge instructions?  Yes   Nursing interventions  Reviewed instructions with patient   What is the patient's perception of their health status since discharge?   Improving [Patient reports that he is feeling better, but did wake up with a headache today. ]   Is the patient/caregiver able to teach back signs and symptoms related to disease process for when to call 911?  Yes [Explained how to use 911 service. ]   Is the patient/caregiver able to teach back the hierarchy of who to call/visit for symptoms/problems? PCP, Specialist, Home health nurse, Urgent Care, ED, 911  Yes [Advised to keep discharge papers with phone #'s for the neurosurgery office. ]   If the patient is a current smoker, are they able to teach back resources for cessation?  Not a smoker   Week 1 call completed?  Yes   Wrap up additional comments  Long call with interpretor having difficulty getting patient to verbalize understanding of information.           Yris Collier RN

## 2021-09-21 LAB — CREAT BLDA-MCNC: 1.7 MG/DL (ref 0.6–1.3)

## 2021-09-24 ENCOUNTER — TELEPHONE (OUTPATIENT)
Dept: NEUROSURGERY | Facility: CLINIC | Age: 61
End: 2021-09-24

## 2021-09-24 ENCOUNTER — HOSPITAL ENCOUNTER (OUTPATIENT)
Dept: CT IMAGING | Facility: HOSPITAL | Age: 61
End: 2021-09-24

## 2021-09-24 NOTE — TELEPHONE ENCOUNTER
LVM for patient's son advising that because the CT scan was rescheduled, his follow-up appointment for today in the office was canceled. We will be calling for the follow-up when the October schedule is open.

## 2021-09-29 LAB
MAXIMAL PREDICTED HEART RATE: 160 BPM
STRESS TARGET HR: 136 BPM

## 2021-09-29 PROCEDURE — 93248 EXT ECG>7D<15D REV&INTERPJ: CPT | Performed by: INTERNAL MEDICINE

## 2021-10-07 ENCOUNTER — TELEPHONE (OUTPATIENT)
Dept: NEUROLOGY | Facility: CLINIC | Age: 61
End: 2021-10-07

## 2021-10-07 NOTE — TELEPHONE ENCOUNTER
----- Message from ADONAY Saldana sent at 10/6/2021  4:20 PM EDT -----  EEG read as abnormal due to evidence of mild diffuse background slowing.  There was no evidence of epileptiform discharges and/or seizure activity or focal slowing present.  No change in current plan-keep planned follow-up

## 2021-10-07 NOTE — TELEPHONE ENCOUNTER
Attempted to reach patient's son, Herrera, regarding EEG and Holter results.  Left message to call back.

## 2021-10-07 NOTE — TELEPHONE ENCOUNTER
----- Message from ADONAY Saldana sent at 10/6/2021  4:19 PM EDT -----  Unremarkable long-term Holter; no evidence of turning arrhythmia specifically no atrial fibrillation.  No change in current plan; keep planned follow-up.

## 2021-10-14 ENCOUNTER — TELEPHONE (OUTPATIENT)
Dept: NEUROSURGERY | Facility: CLINIC | Age: 61
End: 2021-10-14

## 2021-10-14 NOTE — TELEPHONE ENCOUNTER
I called and LVM requesting that they call back. Patient needs to be rescheduled for head CT and follow-up.

## 2023-08-02 ENCOUNTER — HOSPITAL ENCOUNTER (INPATIENT)
Facility: HOSPITAL | Age: 63
LOS: 3 days | Discharge: HOME | DRG: 101 | End: 2023-08-05
Attending: STUDENT IN AN ORGANIZED HEALTH CARE EDUCATION/TRAINING PROGRAM | Admitting: HOSPITALIST

## 2023-08-02 ENCOUNTER — APPOINTMENT (OUTPATIENT)
Dept: GENERAL RADIOLOGY | Facility: HOSPITAL | Age: 63
DRG: 101 | End: 2023-08-02

## 2023-08-02 ENCOUNTER — APPOINTMENT (OUTPATIENT)
Dept: CT IMAGING | Facility: HOSPITAL | Age: 63
DRG: 101 | End: 2023-08-02

## 2023-08-02 DIAGNOSIS — R41.82 ALTERED MENTAL STATUS, UNSPECIFIED ALTERED MENTAL STATUS TYPE: Primary | ICD-10-CM

## 2023-08-02 LAB
ALBUMIN SERPL-MCNC: 4.7 G/DL (ref 3.5–5.2)
ALBUMIN/GLOB SERPL: 1.7 G/DL
ALP SERPL-CCNC: 87 U/L (ref 39–117)
ALT SERPL W P-5'-P-CCNC: 16 U/L (ref 1–41)
AMMONIA BLD-SCNC: 36 UMOL/L (ref 16–60)
AMPHET+METHAMPHET UR QL: NEGATIVE
AMPHETAMINES UR QL: NEGATIVE
ANION GAP SERPL CALCULATED.3IONS-SCNC: 10.8 MMOL/L (ref 5–15)
AST SERPL-CCNC: 24 U/L (ref 1–40)
BACTERIA UR QL AUTO: NORMAL /HPF
BARBITURATES UR QL SCN: NEGATIVE
BASOPHILS # BLD AUTO: 0.01 10*3/MM3 (ref 0–0.2)
BASOPHILS NFR BLD AUTO: 0.2 % (ref 0–1.5)
BENZODIAZ UR QL SCN: NEGATIVE
BILIRUB SERPL-MCNC: 0.5 MG/DL (ref 0–1.2)
BILIRUB UR QL STRIP: NEGATIVE
BUN SERPL-MCNC: 21 MG/DL (ref 8–23)
BUN/CREAT SERPL: 12.9 (ref 7–25)
BUPRENORPHINE SERPL-MCNC: NEGATIVE NG/ML
CALCIUM SPEC-SCNC: 9.6 MG/DL (ref 8.6–10.5)
CANNABINOIDS SERPL QL: NEGATIVE
CHLORIDE SERPL-SCNC: 101 MMOL/L (ref 98–107)
CK SERPL-CCNC: 492 U/L (ref 20–200)
CLARITY UR: CLEAR
CO2 SERPL-SCNC: 24.2 MMOL/L (ref 22–29)
COCAINE UR QL: NEGATIVE
COLOR UR: YELLOW
CREAT SERPL-MCNC: 1.63 MG/DL (ref 0.76–1.27)
D-LACTATE SERPL-SCNC: 1 MMOL/L (ref 0.5–2)
DEPRECATED RDW RBC AUTO: 44.2 FL (ref 37–54)
EGFRCR SERPLBLD CKD-EPI 2021: 47.3 ML/MIN/1.73
EOSINOPHIL # BLD AUTO: 0.01 10*3/MM3 (ref 0–0.4)
EOSINOPHIL NFR BLD AUTO: 0.2 % (ref 0.3–6.2)
ERYTHROCYTE [DISTWIDTH] IN BLOOD BY AUTOMATED COUNT: 14.7 % (ref 12.3–15.4)
ETHANOL BLD-MCNC: <10 MG/DL (ref 0–10)
ETHANOL UR QL: <0.01 %
GLOBULIN UR ELPH-MCNC: 2.8 GM/DL
GLUCOSE SERPL-MCNC: 159 MG/DL (ref 65–99)
GLUCOSE UR STRIP-MCNC: NEGATIVE MG/DL
HCT VFR BLD AUTO: 40.5 % (ref 37.5–51)
HGB BLD-MCNC: 13 G/DL (ref 13–17.7)
HGB UR QL STRIP.AUTO: NEGATIVE
HYALINE CASTS UR QL AUTO: NORMAL /LPF
IMM GRANULOCYTES # BLD AUTO: 0.01 10*3/MM3 (ref 0–0.05)
IMM GRANULOCYTES NFR BLD AUTO: 0.2 % (ref 0–0.5)
KETONES UR QL STRIP: NEGATIVE
LEUKOCYTE ESTERASE UR QL STRIP.AUTO: NEGATIVE
LYMPHOCYTES # BLD AUTO: 0.98 10*3/MM3 (ref 0.7–3.1)
LYMPHOCYTES NFR BLD AUTO: 15.7 % (ref 19.6–45.3)
MCH RBC QN AUTO: 26.4 PG (ref 26.6–33)
MCHC RBC AUTO-ENTMCNC: 32.1 G/DL (ref 31.5–35.7)
MCV RBC AUTO: 82.2 FL (ref 79–97)
METHADONE UR QL SCN: NEGATIVE
MONOCYTES # BLD AUTO: 0.37 10*3/MM3 (ref 0.1–0.9)
MONOCYTES NFR BLD AUTO: 5.9 % (ref 5–12)
NEUTROPHILS NFR BLD AUTO: 4.85 10*3/MM3 (ref 1.7–7)
NEUTROPHILS NFR BLD AUTO: 77.8 % (ref 42.7–76)
NITRITE UR QL STRIP: NEGATIVE
OPIATES UR QL: NEGATIVE
OXYCODONE UR QL SCN: NEGATIVE
PCP UR QL SCN: NEGATIVE
PH UR STRIP.AUTO: 6.5 [PH] (ref 5–8)
PLATELET # BLD AUTO: 246 10*3/MM3 (ref 140–450)
PMV BLD AUTO: 9.7 FL (ref 6–12)
POTASSIUM SERPL-SCNC: 3.6 MMOL/L (ref 3.5–5.2)
PROPOXYPH UR QL: NEGATIVE
PROT SERPL-MCNC: 7.5 G/DL (ref 6–8.5)
PROT UR QL STRIP: ABNORMAL
QT INTERVAL: 385 MS
RBC # BLD AUTO: 4.93 10*6/MM3 (ref 4.14–5.8)
RBC # UR STRIP: NORMAL /HPF
REF LAB TEST METHOD: NORMAL
SODIUM SERPL-SCNC: 136 MMOL/L (ref 136–145)
SP GR UR STRIP: 1.01 (ref 1–1.03)
SQUAMOUS #/AREA URNS HPF: NORMAL /HPF
TRICYCLICS UR QL SCN: NEGATIVE
TROPONIN T SERPL HS-MCNC: 8 NG/L
UROBILINOGEN UR QL STRIP: ABNORMAL
WBC # UR STRIP: NORMAL /HPF
WBC NRBC COR # BLD: 6.23 10*3/MM3 (ref 3.4–10.8)

## 2023-08-02 PROCEDURE — 99285 EMERGENCY DEPT VISIT HI MDM: CPT

## 2023-08-02 PROCEDURE — 80177 DRUG SCRN QUAN LEVETIRACETAM: CPT | Performed by: STUDENT IN AN ORGANIZED HEALTH CARE EDUCATION/TRAINING PROGRAM

## 2023-08-02 PROCEDURE — 82550 ASSAY OF CK (CPK): CPT | Performed by: STUDENT IN AN ORGANIZED HEALTH CARE EDUCATION/TRAINING PROGRAM

## 2023-08-02 PROCEDURE — 25010000002 LEVETRIRACETAM PER 10 MG: Performed by: STUDENT IN AN ORGANIZED HEALTH CARE EDUCATION/TRAINING PROGRAM

## 2023-08-02 PROCEDURE — 25510000001 IOPAMIDOL PER 1 ML: Performed by: STUDENT IN AN ORGANIZED HEALTH CARE EDUCATION/TRAINING PROGRAM

## 2023-08-02 PROCEDURE — 84484 ASSAY OF TROPONIN QUANT: CPT | Performed by: STUDENT IN AN ORGANIZED HEALTH CARE EDUCATION/TRAINING PROGRAM

## 2023-08-02 PROCEDURE — 93010 ELECTROCARDIOGRAM REPORT: CPT | Performed by: INTERNAL MEDICINE

## 2023-08-02 PROCEDURE — 71045 X-RAY EXAM CHEST 1 VIEW: CPT

## 2023-08-02 PROCEDURE — 85025 COMPLETE CBC W/AUTO DIFF WBC: CPT | Performed by: STUDENT IN AN ORGANIZED HEALTH CARE EDUCATION/TRAINING PROGRAM

## 2023-08-02 PROCEDURE — 80306 DRUG TEST PRSMV INSTRMNT: CPT | Performed by: STUDENT IN AN ORGANIZED HEALTH CARE EDUCATION/TRAINING PROGRAM

## 2023-08-02 PROCEDURE — 93005 ELECTROCARDIOGRAM TRACING: CPT | Performed by: STUDENT IN AN ORGANIZED HEALTH CARE EDUCATION/TRAINING PROGRAM

## 2023-08-02 PROCEDURE — 83605 ASSAY OF LACTIC ACID: CPT | Performed by: STUDENT IN AN ORGANIZED HEALTH CARE EDUCATION/TRAINING PROGRAM

## 2023-08-02 PROCEDURE — 80053 COMPREHEN METABOLIC PANEL: CPT | Performed by: STUDENT IN AN ORGANIZED HEALTH CARE EDUCATION/TRAINING PROGRAM

## 2023-08-02 PROCEDURE — 99285 EMERGENCY DEPT VISIT HI MDM: CPT | Performed by: STUDENT IN AN ORGANIZED HEALTH CARE EDUCATION/TRAINING PROGRAM

## 2023-08-02 PROCEDURE — 82140 ASSAY OF AMMONIA: CPT | Performed by: STUDENT IN AN ORGANIZED HEALTH CARE EDUCATION/TRAINING PROGRAM

## 2023-08-02 PROCEDURE — 70496 CT ANGIOGRAPHY HEAD: CPT

## 2023-08-02 PROCEDURE — 70498 CT ANGIOGRAPHY NECK: CPT

## 2023-08-02 PROCEDURE — 82077 ASSAY SPEC XCP UR&BREATH IA: CPT | Performed by: STUDENT IN AN ORGANIZED HEALTH CARE EDUCATION/TRAINING PROGRAM

## 2023-08-02 PROCEDURE — 81001 URINALYSIS AUTO W/SCOPE: CPT | Performed by: STUDENT IN AN ORGANIZED HEALTH CARE EDUCATION/TRAINING PROGRAM

## 2023-08-02 RX ORDER — SODIUM CHLORIDE 0.9 % (FLUSH) 0.9 %
10 SYRINGE (ML) INJECTION AS NEEDED
Status: DISCONTINUED | OUTPATIENT
Start: 2023-08-02 | End: 2023-08-05 | Stop reason: HOSPADM

## 2023-08-02 RX ORDER — LEVETIRACETAM 500 MG/5ML
500 INJECTION, SOLUTION, CONCENTRATE INTRAVENOUS ONCE
Status: COMPLETED | OUTPATIENT
Start: 2023-08-02 | End: 2023-08-02

## 2023-08-02 RX ADMIN — SODIUM CHLORIDE 1000 ML: 9 INJECTION, SOLUTION INTRAVENOUS at 14:53

## 2023-08-02 RX ADMIN — LEVETIRACETAM 500 MG: 100 INJECTION INTRAVENOUS at 15:26

## 2023-08-02 RX ADMIN — LEVETIRACETAM 500 MG: 100 INJECTION INTRAVENOUS at 14:50

## 2023-08-02 RX ADMIN — IOPAMIDOL 100 ML: 755 INJECTION, SOLUTION INTRAVENOUS at 14:54

## 2023-08-02 NOTE — LETTER
August 5, 2023     Patient: Winston Salvador   YOB: 1960   Date of Visit: 8/2/2023       To Whom It May Concern:    It is my medical opinion that Winston Salvador may return to work on 8/7 . Please excuse him from work from 8/1 - 8/6.            Sincerely,        Dr. Sulema Ayers    CC:   No Recipients

## 2023-08-02 NOTE — ED NOTES
"Very difficult to assess and score patient on NIH d/t language barrier and patient confusion.  used for assessment but patient having difficulty following commands. Patient keeps repeating himself about a previous work injury. Family at bedside states patient lives alone but has been acting abnormal since yesterday when he was \"stumbling around\" and patient stated \"I couldn't even speak Argentine.\" Patient unable to articulate if one side of the body feels different than they other, he just keeps stating that his whole body feels \"asleep.\"  "

## 2023-08-02 NOTE — ED NOTES
Patient able to speak more and follow commands more so at this time, but still repeating himself and talking about where he used to work.

## 2023-08-02 NOTE — FSED PROVIDER NOTE
"Subjective   History of Present Illness  64yo M h/o CVA, seizure on keppra, abnormal MRI p/w altered mental status. All history obtained via family at bedside. Patient does not respond to questions appropriately frequently saying \"I didn't go to work.\" Does not seem to understand the questions and also attempting to speak occasionally but unable to form words at which point pt would be visibly frustrated and upset. Per family member at bedside patient lives alone, yesterday morning was found to have difficulty speaking, acting childish, reaching for things that were not there and confused also leaning towards the R. Was seen by his family member yesterday. Today another family member went to visit patient and noted patient was better as he was able to speak but continues to be very confused and repeating the same thing over and over again and does not answer questions correctly. Patient today is not leaning to the R. Per family members at bedside, suzanna has only been complaining that his feet are numb but otherwise has not made any sense. Per family, this has happened to faby once before in 2021, was hospitalized at [facility]. Per chart review, patient was diagnosed with seizure with post ictal phase, andre's paralysis, also had a CT that showed an old stroke, along with abnormal MRI concerning for possible cerebritis, bleed or abscess, was seen by nsgy who had low supsicion for infectious etiology or bleed and recommended repeat mri in 4-6 weeks however per family patient refused another mri at that time. Family members who are at beside do not know if patient takes any medications. They did not see any seizure like activity yesterday or today.    History provided by:  Relative    Review of Systems   Reason unable to perform ROS: AMS.   Neurological:  Positive for speech difficulty, weakness and numbness.     Past Medical History:   Diagnosis Date    Acute gout of left hand 9/10/2021    CAITLIN (acute kidney " "injury) 9/7/2021    Stroke        No Known Allergies    Past Surgical History:   Procedure Laterality Date    APPENDECTOMY         Family History   Problem Relation Age of Onset    No Known Problems Mother     No Known Problems Father        Social History     Socioeconomic History    Marital status: Single   Tobacco Use    Smoking status: Never    Smokeless tobacco: Never   Vaping Use    Vaping Use: Never used   Substance and Sexual Activity    Alcohol use: Yes     Alcohol/week: 1.0 standard drink     Types: 1 Shots of liquor per week     Comment: occasional    Drug use: Never    Sexual activity: Defer           Objective   Physical Exam  Vitals and nursing note reviewed.   Constitutional:       General: He is in acute distress.      Appearance: He is ill-appearing.      Comments: Awake, not alert   HENT:      Head: Normocephalic and atraumatic.      Mouth/Throat:      Mouth: Mucous membranes are moist.   Eyes:      General: No scleral icterus.     Extraocular Movements: Extraocular movements intact.      Pupils: Pupils are equal, round, and reactive to light.   Neck:      Meningeal: Brudzinski's sign and Kernig's sign absent.   Cardiovascular:      Rate and Rhythm: Normal rate and regular rhythm.      Heart sounds: Normal heart sounds.   Pulmonary:      Effort: Pulmonary effort is normal.      Breath sounds: Normal breath sounds. No wheezing, rhonchi or rales.   Abdominal:      Palpations: Abdomen is soft.      Tenderness: There is no abdominal tenderness. There is no guarding.   Musculoskeletal:         General: Normal range of motion.      Cervical back: Normal range of motion and neck supple. No rigidity.   Skin:     General: Skin is warm and dry.      Capillary Refill: Capillary refill takes less than 2 seconds.   Neurological:      Comments: Awake, very confused, keeps repeating \"I didn't go to work,\" expressive aphasia, will respond to some commands appropriately, gait intact (patient walked in), " motor b/l UE intact 5/5, unable to test sensation given patient does not respond yes or no when asking him if he can fell, patient does not understand most of exam, EOMI but does not track, mainly looking staring forward but only time he looks to the left and right are when the door opens or family member walks to his L side occasionally, no facial droop, speech slurred       Procedures           ED Course  ED Course as of 08/02/23 1610   Wed Aug 02, 2023   1530 Patient with improvement in mental status, speech is no longer slurred and now with improved comprehension, still responds with information about his job whenever asked to answer a question but will respond to commands now more appropriately. Family at bedside reports he has been progressively improving. Patient now laughing appropriatly, reacting to conversations no longer just staring forward [SH]   1534 EKG sinus rate 73, normal axis, no yunior, no std, twi inferior leads, poor rw progression normal pr qrs and qtc [SH]      ED Course User Index  [SH] Aziza Santos MD                                           Medical Decision Making  62yo M h/o CVA, seizure on keppra, abnormal MRI p/w altered mental status. Unable to do an accurate NIHSS given patient does not really understand the commands but does appear with expressive aphasia, mild slurred speech, patient does have symptoms concerning for CVA, possible mass or abscess given prior finding, metabolic encephalopathy, encephalitis however no fever. Lower suspicion for seizure with post ictal episode at this time given patient's symptoms have been ongoing for >24 hours. Will obtain labs, CT, EKG     Called pt's pharmacy - last rx picked up was 1yr ago - Tylenol, ibuprofen, flexeril, no keppra rx    Problems Addressed:  Altered mental status, unspecified altered mental status type: complicated acute illness or injury    Amount and/or Complexity of Data Reviewed  Labs: ordered. Decision-making details documented  in ED Course.  Radiology: ordered and independent interpretation performed. Decision-making details documented in ED Course.  ECG/medicine tests: ordered and independent interpretation performed. Decision-making details documented in ED Course.    Risk  Prescription drug management.  Decision regarding hospitalization.    Critical Care  Total time providing critical care: 180 minutes      Final diagnoses:   Altered mental status, unspecified altered mental status type       ED Disposition  ED Disposition       ED Disposition   Decision to Admit    Condition   --    Comment   Level of Care: Telemetry [5]   Diagnosis: Altered mental status [780.97.ICD-9-CM]   Admitting Physician: ARTUR PARIKH [[id number]]   Attending Physician: ARTUR PARIKH [[id number]]   Certification: I Certify That Inpatient Hospital Services Are Medically Necessary For Greater Than 2 Midnights                 No follow-up provider specified.       Medication List      No changes were made to your prescriptions during this visit.

## 2023-08-02 NOTE — ED NOTES
Call was placed to [facility] pharmacy to verify medications, [facility] states prescriptions have not been since August 2022.  No Keppra on file there.  TONNY A RN

## 2023-08-02 NOTE — ED NOTES
Per patient's chart review, patient has hx of seizures. Patient's family denies witnessing any seizure activity recently. They also do not think patient has been taking any anti-seizure medications.

## 2023-08-02 NOTE — ED NOTES
Nursing report ED to floor  Winston Juice Salvador  63 y.o.  male    HPI :   Chief Complaint   Patient presents with    Altered Mental Status     Difficulty speaking since yesterday        Admitting doctor:   Lay Marks MD    Admitting diagnosis:   The encounter diagnosis was Altered mental status, unspecified altered mental status type.    Code status:   Current Code Status       Date Active Code Status Order ID Comments User Context       Prior            Allergies:   Patient has no known allergies.    Isolation:   No active isolations    Intake and Output  No intake or output data in the 24 hours ending 08/02/23 1649    Weight:       08/02/23  1355   Weight: 85.1 kg (187 lb 9.6 oz)       Most recent vitals:   Vitals:    08/02/23 1455 08/02/23 1537 08/02/23 1545 08/02/23 1615   BP: 140/91  131/49 140/95   BP Location: Left arm      Patient Position: Lying      Pulse: 76  82 69   Resp: 18  16 14   Temp:  98 øF (36.7 øC)     TempSrc:       SpO2: 98%  98% 100%   Weight:       Height:           Active LDAs/IV Access:   Lines, Drains & Airways       Active LDAs       Name Placement date Placement time Site Days    Peripheral IV 08/02/23 1400 Left Antecubital 08/02/23  1400  Antecubital  less than 1    Peripheral IV 08/02/23 1419 Right Antecubital 08/02/23  1419  Antecubital  less than 1                    Labs (abnormal labs have a star):   Labs Reviewed   COMPREHENSIVE METABOLIC PANEL - Abnormal; Notable for the following components:       Result Value    Glucose 159 (*)     Creatinine 1.63 (*)     eGFR 47.3 (*)     All other components within normal limits    Narrative:     GFR Normal >60  Chronic Kidney Disease <60  Kidney Failure <15     CBC WITH AUTO DIFFERENTIAL - Abnormal; Notable for the following components:    MCH 26.4 (*)     Neutrophil % 77.8 (*)     Lymphocyte % 15.7 (*)     Eosinophil % 0.2 (*)     All other components within normal limits   URINALYSIS W/ CULTURE IF INDICATED - Abnormal;  Notable for the following components:    Protein,  mg/dL (2+) (*)     All other components within normal limits    Narrative:     In absence of clinical symptoms, the presence of pyuria, bacteria, and/or nitrites on the urinalysis result does not correlate with infection.   CK - Abnormal; Notable for the following components:    Creatine Kinase 492 (*)     All other components within normal limits   SINGLE HSTROPONIN T - Normal    Narrative:     High Sensitive Troponin T Reference Range:  <10.0 ng/L- Negative Female for AMI  <15.0 ng/L- Negative Male for AMI  >=10 - Abnormal Female indicating possible myocardial injury.  >=15 - Abnormal Male indicating possible myocardial injury.   Clinicians would have to utilize clinical acumen, EKG, Troponin, and serial changes to determine if it is an Acute Myocardial Infarction or myocardial injury due to an underlying chronic condition.        LACTIC ACID, PLASMA - Normal   URINE DRUG SCREEN - Normal    Narrative:     Cutoff For Drugs Screened:    Amphetamines               500 ng/ml  Barbiturates               200 ng/ml  Benzodiazepines            150 ng/ml  Cocaine                    150 ng/ml  Methadone                  200 ng/ml  Opiates                    100 ng/ml  Phencyclidine               25 ng/ml  THC                            50 ng/ml  Methamphetamine            500 ng/ml  Tricyclic Antidepressants  300 ng/ml  Oxycodone                  100 ng/ml  Propoxyphene               300 ng/ml  Buprenorphine               10 ng/ml    The normal value for all drugs tested is negative. This report includes unconfirmed screening results, with the cutoff values listed, to be used for medical treatment purposes only.  Unconfirmed results must not be used for non-medical purposes such as employment or legal testing.  Clinical consideration should be applied to any drug of abuse test, particularly when unconfirmed results are used.     AMMONIA - Normal   ETHANOL    LEVETIRACETAM LEVEL   URINALYSIS, MICROSCOPIC ONLY   CBC AND DIFFERENTIAL    Narrative:     The following orders were created for panel order CBC & Differential.  Procedure                               Abnormality         Status                     ---------                               -----------         ------                     CBC Auto Differential[[id number]]        Abnormal            Final result                 Please view results for these tests on the individual orders.       EKG:   ECG 12 Lead ED Triage Standing Order; Stroke (Onset >12 hrs)   Preliminary Result   HEART RATE= 73  bpm   RR Interval= 822  ms   AR Interval= 183  ms   P Horizontal Axis= -26  deg   P Front Axis= 50  deg   QRSD Interval= 88  ms   QT Interval= 385  ms   QRS Axis= -12  deg   T Wave Axis= -6  deg   - BORDERLINE ECG -   Sinus rhythm   RSR' in V1 or V2, probably normal variant   Borderline T abnormalities, inferior leads   Baseline wander in lead(s) V2   Electronically Signed By:    Date and Time of Study: 2023-08-02 15:31:52          Meds given in ED:   Medications   sodium chloride 0.9 % flush 10 mL (has no administration in time range)   levETIRAcetam (KEPPRA) injection 500 mg (500 mg Intravenous Given 8/2/23 1450)   sodium chloride 0.9 % bolus 1,000 mL (0 mL Intravenous Stopped 8/2/23 1613)   iopamidol (ISOVUE-370) 76 % injection 100 mL (100 mL Intravenous Given 8/2/23 1454)   levETIRAcetam (KEPPRA) injection 500 mg (500 mg Intravenous Given 8/2/23 1526)       Imaging results:  CT Angiogram Neck    Result Date: 8/2/2023  1.  Encephalomalacia involving the right frontal lobe anteriorly is noted, unchanged. There is extensive small vessel ischemic disease. 2.  The prior CT angiogram of 09/21/2021 demonstrated a peripherally-enhancing lesion involving the left parietal lobe laterally measuring approximately 17 mm in size. This is not appreciated on the current examination. Further evaluation could be performed with an MRI  examination of the brain with and without contrast. 3.  There is 0% stenosis involving the carotid bifurcations using NASCET criteria. There is no evidence of a proximal intracranial high-grade stenosis or occlusion.  NOTE: This is a preliminary report. The three-dimensional reconstructions are not yet available for review.    Radiation dose reduction techniques were utilized, including automated exposure control and exposure modulation based on body size.       XR Chest 1 View    Result Date: 8/2/2023  No focal pulmonary consolidation. Borderline heart size. Tortuous aorta. Follow-up as clinically indicated.  This report was finalized on 8/2/2023 2:36 PM by Dr. Tray Durán M.D.      CT Angiogram Head    Result Date: 8/2/2023  1.  Encephalomalacia involving the right frontal lobe anteriorly is noted, unchanged. There is extensive small vessel ischemic disease. 2.  The prior CT angiogram of 09/21/2021 demonstrated a peripherally-enhancing lesion involving the left parietal lobe laterally measuring approximately 17 mm in size. This is not appreciated on the current examination. Further evaluation could be performed with an MRI examination of the brain with and without contrast. 3.  There is 0% stenosis involving the carotid bifurcations using NASCET criteria. There is no evidence of a proximal intracranial high-grade stenosis or occlusion.  NOTE: This is a preliminary report. The three-dimensional reconstructions are not yet available for review.    Radiation dose reduction techniques were utilized, including automated exposure control and exposure modulation based on body size.        Ambulatory status:   - stand by assist    Social issues:   Social History     Socioeconomic History    Marital status: Single   Tobacco Use    Smoking status: Never    Smokeless tobacco: Never   Vaping Use    Vaping Use: Never used   Substance and Sexual Activity    Alcohol use: Yes     Alcohol/week: 1.0 standard drink     Types:  1 Shots of liquor per week     Comment: occasional    Drug use: Never    Sexual activity: Defer       NIH Stroke Scale:  Interval: baseline    Spencer Velez RN  08/02/23 16:49 EDT

## 2023-08-03 ENCOUNTER — APPOINTMENT (OUTPATIENT)
Dept: MRI IMAGING | Facility: HOSPITAL | Age: 63
DRG: 101 | End: 2023-08-03

## 2023-08-03 ENCOUNTER — APPOINTMENT (OUTPATIENT)
Dept: NEUROLOGY | Facility: HOSPITAL | Age: 63
DRG: 101 | End: 2023-08-03

## 2023-08-03 PROBLEM — M62.82 NON-TRAUMATIC RHABDOMYOLYSIS: Status: ACTIVE | Noted: 2023-08-03

## 2023-08-03 LAB
ALBUMIN SERPL-MCNC: 3.4 G/DL (ref 3.5–5.2)
ALBUMIN/GLOB SERPL: 1.3 G/DL
ALP SERPL-CCNC: 71 U/L (ref 39–117)
ALT SERPL W P-5'-P-CCNC: 15 U/L (ref 1–41)
ANION GAP SERPL CALCULATED.3IONS-SCNC: 10.3 MMOL/L (ref 5–15)
AST SERPL-CCNC: 22 U/L (ref 1–40)
BILIRUB SERPL-MCNC: <0.2 MG/DL (ref 0–1.2)
BUN SERPL-MCNC: 18 MG/DL (ref 8–23)
BUN/CREAT SERPL: 13.6 (ref 7–25)
CALCIUM SPEC-SCNC: 8.6 MG/DL (ref 8.6–10.5)
CHLORIDE SERPL-SCNC: 106 MMOL/L (ref 98–107)
CHOLEST SERPL-MCNC: 194 MG/DL (ref 0–200)
CO2 SERPL-SCNC: 22.7 MMOL/L (ref 22–29)
CREAT SERPL-MCNC: 1.32 MG/DL (ref 0.76–1.27)
DEPRECATED RDW RBC AUTO: 44.5 FL (ref 37–54)
EGFRCR SERPLBLD CKD-EPI 2021: 61 ML/MIN/1.73
ERYTHROCYTE [DISTWIDTH] IN BLOOD BY AUTOMATED COUNT: 15.2 % (ref 12.3–15.4)
FOLATE SERPL-MCNC: 15.5 NG/ML (ref 4.78–24.2)
GLOBULIN UR ELPH-MCNC: 2.6 GM/DL
GLUCOSE BLDC GLUCOMTR-MCNC: 105 MG/DL (ref 70–130)
GLUCOSE BLDC GLUCOMTR-MCNC: 133 MG/DL (ref 70–130)
GLUCOSE BLDC GLUCOMTR-MCNC: 140 MG/DL (ref 70–130)
GLUCOSE SERPL-MCNC: 154 MG/DL (ref 65–99)
HBA1C MFR BLD: 6.5 % (ref 4.8–5.6)
HCT VFR BLD AUTO: 34.4 % (ref 37.5–51)
HDLC SERPL-MCNC: 26 MG/DL (ref 40–60)
HGB BLD-MCNC: 11.3 G/DL (ref 13–17.7)
LDLC SERPL CALC-MCNC: 125 MG/DL (ref 0–100)
LDLC/HDLC SERPL: 4.62 {RATIO}
MCH RBC QN AUTO: 26.7 PG (ref 26.6–33)
MCHC RBC AUTO-ENTMCNC: 32.8 G/DL (ref 31.5–35.7)
MCV RBC AUTO: 81.3 FL (ref 79–97)
PLATELET # BLD AUTO: 212 10*3/MM3 (ref 140–450)
PMV BLD AUTO: 10.2 FL (ref 6–12)
POTASSIUM SERPL-SCNC: 3.5 MMOL/L (ref 3.5–5.2)
PROT SERPL-MCNC: 6 G/DL (ref 6–8.5)
QT INTERVAL: 403 MS
RBC # BLD AUTO: 4.23 10*6/MM3 (ref 4.14–5.8)
SODIUM SERPL-SCNC: 139 MMOL/L (ref 136–145)
T4 FREE SERPL-MCNC: 1.09 NG/DL (ref 0.93–1.7)
TRIGL SERPL-MCNC: 239 MG/DL (ref 0–150)
TSH SERPL DL<=0.05 MIU/L-ACNC: 5.68 UIU/ML (ref 0.27–4.2)
TSH SERPL DL<=0.05 MIU/L-ACNC: 5.99 UIU/ML (ref 0.27–4.2)
VIT B12 BLD-MCNC: 792 PG/ML (ref 211–946)
VLDLC SERPL-MCNC: 43 MG/DL (ref 5–40)
WBC NRBC COR # BLD: 5.99 10*3/MM3 (ref 3.4–10.8)

## 2023-08-03 PROCEDURE — 84439 ASSAY OF FREE THYROXINE: CPT | Performed by: PSYCHIATRY & NEUROLOGY

## 2023-08-03 PROCEDURE — 84443 ASSAY THYROID STIM HORMONE: CPT | Performed by: NURSE PRACTITIONER

## 2023-08-03 PROCEDURE — 80061 LIPID PANEL: CPT | Performed by: NURSE PRACTITIONER

## 2023-08-03 PROCEDURE — 85027 COMPLETE CBC AUTOMATED: CPT | Performed by: NURSE PRACTITIONER

## 2023-08-03 PROCEDURE — 25010000002 LEVETRIRACETAM PER 10 MG: Performed by: NURSE PRACTITIONER

## 2023-08-03 PROCEDURE — 84443 ASSAY THYROID STIM HORMONE: CPT | Performed by: PSYCHIATRY & NEUROLOGY

## 2023-08-03 PROCEDURE — 99253 IP/OBS CNSLTJ NEW/EST LOW 45: CPT | Performed by: INTERNAL MEDICINE

## 2023-08-03 PROCEDURE — 93010 ELECTROCARDIOGRAM REPORT: CPT | Performed by: INTERNAL MEDICINE

## 2023-08-03 PROCEDURE — 82607 VITAMIN B-12: CPT | Performed by: PSYCHIATRY & NEUROLOGY

## 2023-08-03 PROCEDURE — 93005 ELECTROCARDIOGRAM TRACING: CPT | Performed by: INTERNAL MEDICINE

## 2023-08-03 PROCEDURE — 97165 OT EVAL LOW COMPLEX 30 MIN: CPT

## 2023-08-03 PROCEDURE — 99221 1ST HOSP IP/OBS SF/LOW 40: CPT | Performed by: PSYCHIATRY & NEUROLOGY

## 2023-08-03 PROCEDURE — 82746 ASSAY OF FOLIC ACID SERUM: CPT | Performed by: PSYCHIATRY & NEUROLOGY

## 2023-08-03 PROCEDURE — 80053 COMPREHEN METABOLIC PANEL: CPT | Performed by: NURSE PRACTITIONER

## 2023-08-03 PROCEDURE — 25010000002 LEVETRIRACETAM PER 10 MG: Performed by: PSYCHIATRY & NEUROLOGY

## 2023-08-03 PROCEDURE — 82948 REAGENT STRIP/BLOOD GLUCOSE: CPT

## 2023-08-03 PROCEDURE — 95819 EEG AWAKE AND ASLEEP: CPT

## 2023-08-03 PROCEDURE — 70551 MRI BRAIN STEM W/O DYE: CPT

## 2023-08-03 PROCEDURE — 83036 HEMOGLOBIN GLYCOSYLATED A1C: CPT | Performed by: NURSE PRACTITIONER

## 2023-08-03 PROCEDURE — 95819 EEG AWAKE AND ASLEEP: CPT | Performed by: PSYCHIATRY & NEUROLOGY

## 2023-08-03 RX ORDER — ATORVASTATIN CALCIUM 80 MG/1
80 TABLET, FILM COATED ORAL NIGHTLY
Status: DISCONTINUED | OUTPATIENT
Start: 2023-08-03 | End: 2023-08-05 | Stop reason: HOSPADM

## 2023-08-03 RX ORDER — ACETAMINOPHEN 650 MG/1
650 SUPPOSITORY RECTAL EVERY 4 HOURS PRN
Status: DISCONTINUED | OUTPATIENT
Start: 2023-08-03 | End: 2023-08-05 | Stop reason: HOSPADM

## 2023-08-03 RX ORDER — FAMOTIDINE 20 MG/1
20 TABLET, FILM COATED ORAL
Status: DISCONTINUED | OUTPATIENT
Start: 2023-08-03 | End: 2023-08-05 | Stop reason: HOSPADM

## 2023-08-03 RX ORDER — LEVETIRACETAM 500 MG/5ML
500 INJECTION, SOLUTION, CONCENTRATE INTRAVENOUS ONCE
Status: COMPLETED | OUTPATIENT
Start: 2023-08-03 | End: 2023-08-03

## 2023-08-03 RX ORDER — ASPIRIN 325 MG
325 TABLET ORAL DAILY
Status: DISCONTINUED | OUTPATIENT
Start: 2023-08-03 | End: 2023-08-04

## 2023-08-03 RX ORDER — ACETAMINOPHEN 650 MG/1
650 SUPPOSITORY RECTAL EVERY 4 HOURS PRN
Status: DISCONTINUED | OUTPATIENT
Start: 2023-08-03 | End: 2023-08-03 | Stop reason: SDUPTHER

## 2023-08-03 RX ORDER — ACETAMINOPHEN 325 MG/1
650 TABLET ORAL EVERY 4 HOURS PRN
Status: DISCONTINUED | OUTPATIENT
Start: 2023-08-03 | End: 2023-08-05 | Stop reason: HOSPADM

## 2023-08-03 RX ORDER — BISACODYL 5 MG/1
5 TABLET, DELAYED RELEASE ORAL DAILY PRN
Status: DISCONTINUED | OUTPATIENT
Start: 2023-08-03 | End: 2023-08-05 | Stop reason: HOSPADM

## 2023-08-03 RX ORDER — ALUMINA, MAGNESIA, AND SIMETHICONE 2400; 2400; 240 MG/30ML; MG/30ML; MG/30ML
15 SUSPENSION ORAL EVERY 6 HOURS PRN
Status: DISCONTINUED | OUTPATIENT
Start: 2023-08-03 | End: 2023-08-05 | Stop reason: HOSPADM

## 2023-08-03 RX ORDER — BISACODYL 10 MG
10 SUPPOSITORY, RECTAL RECTAL DAILY PRN
Status: DISCONTINUED | OUTPATIENT
Start: 2023-08-03 | End: 2023-08-05 | Stop reason: HOSPADM

## 2023-08-03 RX ORDER — LABETALOL HYDROCHLORIDE 5 MG/ML
10 INJECTION, SOLUTION INTRAVENOUS
Status: DISCONTINUED | OUTPATIENT
Start: 2023-08-03 | End: 2023-08-05 | Stop reason: HOSPADM

## 2023-08-03 RX ORDER — BISACODYL 10 MG
10 SUPPOSITORY, RECTAL RECTAL DAILY PRN
Status: DISCONTINUED | OUTPATIENT
Start: 2023-08-03 | End: 2023-08-03 | Stop reason: SDUPTHER

## 2023-08-03 RX ORDER — ASPIRIN 300 MG/1
300 SUPPOSITORY RECTAL DAILY
Status: DISCONTINUED | OUTPATIENT
Start: 2023-08-03 | End: 2023-08-04

## 2023-08-03 RX ORDER — NITROGLYCERIN 0.4 MG/1
0.4 TABLET SUBLINGUAL
Status: DISCONTINUED | OUTPATIENT
Start: 2023-08-03 | End: 2023-08-05 | Stop reason: HOSPADM

## 2023-08-03 RX ORDER — LEVETIRACETAM 500 MG/5ML
500 INJECTION, SOLUTION, CONCENTRATE INTRAVENOUS EVERY 12 HOURS SCHEDULED
Status: DISCONTINUED | OUTPATIENT
Start: 2023-08-03 | End: 2023-08-03

## 2023-08-03 RX ORDER — SODIUM CHLORIDE 0.9 % (FLUSH) 0.9 %
10 SYRINGE (ML) INJECTION AS NEEDED
Status: DISCONTINUED | OUTPATIENT
Start: 2023-08-03 | End: 2023-08-05 | Stop reason: HOSPADM

## 2023-08-03 RX ORDER — ACETAMINOPHEN 325 MG/1
650 TABLET ORAL EVERY 4 HOURS PRN
Status: DISCONTINUED | OUTPATIENT
Start: 2023-08-03 | End: 2023-08-03 | Stop reason: SDUPTHER

## 2023-08-03 RX ORDER — POLYETHYLENE GLYCOL 3350 17 G/17G
17 POWDER, FOR SOLUTION ORAL DAILY PRN
Status: DISCONTINUED | OUTPATIENT
Start: 2023-08-03 | End: 2023-08-05 | Stop reason: HOSPADM

## 2023-08-03 RX ORDER — ACETAMINOPHEN 160 MG/5ML
650 SOLUTION ORAL EVERY 4 HOURS PRN
Status: DISCONTINUED | OUTPATIENT
Start: 2023-08-03 | End: 2023-08-05 | Stop reason: HOSPADM

## 2023-08-03 RX ORDER — ONDANSETRON 2 MG/ML
4 INJECTION INTRAMUSCULAR; INTRAVENOUS EVERY 6 HOURS PRN
Status: DISCONTINUED | OUTPATIENT
Start: 2023-08-03 | End: 2023-08-03 | Stop reason: SDUPTHER

## 2023-08-03 RX ORDER — LEVETIRACETAM 500 MG/5ML
1000 INJECTION, SOLUTION, CONCENTRATE INTRAVENOUS EVERY 12 HOURS SCHEDULED
Status: DISCONTINUED | OUTPATIENT
Start: 2023-08-03 | End: 2023-08-05 | Stop reason: HOSPADM

## 2023-08-03 RX ORDER — ONDANSETRON 2 MG/ML
4 INJECTION INTRAMUSCULAR; INTRAVENOUS EVERY 6 HOURS PRN
Status: DISCONTINUED | OUTPATIENT
Start: 2023-08-03 | End: 2023-08-05 | Stop reason: HOSPADM

## 2023-08-03 RX ORDER — SODIUM CHLORIDE 9 MG/ML
75 INJECTION, SOLUTION INTRAVENOUS CONTINUOUS
Status: DISCONTINUED | OUTPATIENT
Start: 2023-08-03 | End: 2023-08-04

## 2023-08-03 RX ORDER — AMOXICILLIN 250 MG
2 CAPSULE ORAL 2 TIMES DAILY
Status: DISCONTINUED | OUTPATIENT
Start: 2023-08-03 | End: 2023-08-05 | Stop reason: HOSPADM

## 2023-08-03 RX ORDER — ONDANSETRON 4 MG/1
4 TABLET, FILM COATED ORAL EVERY 6 HOURS PRN
Status: DISCONTINUED | OUTPATIENT
Start: 2023-08-03 | End: 2023-08-05 | Stop reason: HOSPADM

## 2023-08-03 RX ORDER — ACETAMINOPHEN 160 MG/5ML
650 SOLUTION ORAL EVERY 4 HOURS PRN
Status: DISCONTINUED | OUTPATIENT
Start: 2023-08-03 | End: 2023-08-03 | Stop reason: SDUPTHER

## 2023-08-03 RX ORDER — NICOTINE 21 MG/24HR
1 PATCH, TRANSDERMAL 24 HOURS TRANSDERMAL
Status: DISCONTINUED | OUTPATIENT
Start: 2023-08-03 | End: 2023-08-05 | Stop reason: HOSPADM

## 2023-08-03 RX ADMIN — SENNOSIDES AND DOCUSATE SODIUM 2 TABLET: 50; 8.6 TABLET ORAL at 21:18

## 2023-08-03 RX ADMIN — LEVETIRACETAM 500 MG: 100 INJECTION INTRAVENOUS at 08:32

## 2023-08-03 RX ADMIN — LEVETIRACETAM 1000 MG: 100 INJECTION INTRAVENOUS at 21:16

## 2023-08-03 RX ADMIN — ATORVASTATIN CALCIUM 80 MG: 80 TABLET, FILM COATED ORAL at 21:16

## 2023-08-03 RX ADMIN — FAMOTIDINE 20 MG: 20 TABLET, FILM COATED ORAL at 17:17

## 2023-08-03 RX ADMIN — ASPIRIN 325 MG: 325 TABLET ORAL at 08:31

## 2023-08-03 RX ADMIN — SENNOSIDES AND DOCUSATE SODIUM 2 TABLET: 50; 8.6 TABLET ORAL at 01:18

## 2023-08-03 RX ADMIN — NICOTINE 1 PATCH: 21 PATCH, EXTENDED RELEASE TRANSDERMAL at 08:31

## 2023-08-03 RX ADMIN — SODIUM CHLORIDE 75 ML/HR: 9 INJECTION, SOLUTION INTRAVENOUS at 21:18

## 2023-08-03 RX ADMIN — LEVETIRACETAM 500 MG: 100 INJECTION INTRAVENOUS at 11:35

## 2023-08-03 RX ADMIN — SODIUM CHLORIDE 75 ML/HR: 9 INJECTION, SOLUTION INTRAVENOUS at 01:19

## 2023-08-03 RX ADMIN — SENNOSIDES AND DOCUSATE SODIUM 2 TABLET: 50; 8.6 TABLET ORAL at 08:31

## 2023-08-03 RX ADMIN — ATORVASTATIN CALCIUM 80 MG: 80 TABLET, FILM COATED ORAL at 01:19

## 2023-08-03 NOTE — PLAN OF CARE
Goal Outcome Evaluation:  Plan of Care Reviewed With: patient, family           Outcome Evaluation: Pt is a 63 y.o male admitted from home after being found by family with difficulty speaking, confusion, abnormal behaviour/hallucinations. He has hx of seizure, traumatic brain injury, stroke with right frontal and left parietal encephalomalacia. Per neurology he has been noncomplient with seizure medications. Today OT observered pt sitting EOB feeding self without difficulty. He was steady on his feet with mobility in room and able to complete ADLs without difficult. Pt and family report they feel pt is back to his baseline. No OT needs at this time.      Anticipated Discharge Disposition (OT): home

## 2023-08-03 NOTE — PLAN OF CARE
Problem: Fall Injury Risk  Goal: Absence of Fall and Fall-Related Injury  Intervention: Identify and Manage Contributors  Recent Flowsheet Documentation  Taken 8/3/2023 0500 by Mely Menchaca RN  Medication Review/Management: medications reviewed  Taken 8/3/2023 0300 by Mely Menchaca RN  Medication Review/Management: medications reviewed  Taken 8/3/2023 0100 by Mely Menchaca, RN  Medication Review/Management: medications reviewed  Intervention: Promote Injury-Free Environment  Recent Flowsheet Documentation  Taken 8/3/2023 0500 by Mely Menchaca, RN  Safety Promotion/Fall Prevention:   safety round/check completed   room organization consistent   nonskid shoes/slippers when out of bed   muscle strengthening facilitated   lighting adjusted   gait belt   fall prevention program maintained   clutter free environment maintained   assistive device/personal items within reach   activity supervised  Taken 8/3/2023 0300 by Mely Menchaca, BUDDY  Safety Promotion/Fall Prevention:   safety round/check completed   room organization consistent   nonskid shoes/slippers when out of bed   muscle strengthening facilitated   lighting adjusted   gait belt   fall prevention program maintained   clutter free environment maintained   assistive device/personal items within reach   activity supervised  Taken 8/3/2023 0100 by Mely Menchaca, RN  Safety Promotion/Fall Prevention:   safety round/check completed   room organization consistent   nonskid shoes/slippers when out of bed   muscle strengthening facilitated   lighting adjusted   gait belt   fall prevention program maintained   clutter free environment maintained   assistive device/personal items within reach   activity supervised   Goal Outcome Evaluation:

## 2023-08-03 NOTE — H&P
Patient Name:  Winston Salvador  YOB: 1960  MRN:  [id number]  Admit Date:  8/2/2023  Patient Care Team:  Provider, No Known as PCP - General      Subjective   History Present Illness     Chief Complaint   Patient presents with    Altered Mental Status     Difficulty speaking since yesterday      History of Present Illness  Mr. Juice Salvador is a 63 y.o. smoker with a history of seizures and previous CVA that presents to [facility] from the freestanding emergency department secondary to altered mental status.  Patient currently lives alone and was found by family 2 mornings ago with difficulty speaking, weird behavior, confusion, and hallucinations.  Yesterday morning, his family members went back to check on him as he did not show up for work and his symptoms were somewhat better but he was still confused. In the emergency department, he exhibited some expressive aphasia, confusion, and slurred speech.  Prior to transfer, symptoms slowly improved.  He was transferred here for further evaluation.    Review of Systems   Unable to perform ROS: Mental status change      Personal History     Past Medical History:   Diagnosis Date    Acute gout of left hand 9/10/2021    CAITLIN (acute kidney injury) 9/7/2021    Stroke      Past Surgical History:   Procedure Laterality Date    APPENDECTOMY       Family History   Problem Relation Age of Onset    No Known Problems Mother     No Known Problems Father      Social History     Tobacco Use    Smoking status: Never    Smokeless tobacco: Never   Vaping Use    Vaping Use: Never used   Substance Use Topics    Alcohol use: Yes     Alcohol/week: 1.0 standard drink     Types: 1 Shots of liquor per week     Comment: occasional    Drug use: Never     No current facility-administered medications on file prior to encounter.     Current Outpatient Medications on File Prior to Encounter   Medication Sig Dispense Refill    ibuprofen  (ADVIL,MOTRIN) 800 MG tablet Take 1 tablet by mouth Every 8 (Eight) Hours As Needed for Moderate Pain . 30 tablet 0    levETIRAcetam (KEPPRA) 1000 MG tablet Take 1 tablet by mouth Every 12 (Twelve) Hours for 30 days. 60 tablet 0     No Known Allergies    Objective    Objective     Vital Signs  Temp:  [98 øF (36.7 øC)-99 øF (37.2 øC)] 98.1 øF (36.7 øC)  Heart Rate:  [69-89] 70  Resp:  [14-18] 14  BP: (111-152)/(49-99) 114/85  SpO2:  [97 %-100 %] 98 %  on   ;   Device (Oxygen Therapy): room air  Body mass index is 29.38 kg/mý.    Physical Exam  Vitals and nursing note reviewed.   Constitutional:       General: He is not in acute distress.     Appearance: He is well-developed. He is not toxic-appearing.      Comments:  used   HENT:      Head: Normocephalic and atraumatic.   Eyes:      General: No scleral icterus.        Right eye: No discharge.         Left eye: No discharge.      Extraocular Movements: Extraocular movements intact.      Conjunctiva/sclera: Conjunctivae normal.      Pupils: Pupils are equal, round, and reactive to light.   Neck:      Vascular: No JVD.   Cardiovascular:      Rate and Rhythm: Normal rate and regular rhythm.      Heart sounds: Normal heart sounds. No murmur heard.    No friction rub. No gallop.   Pulmonary:      Effort: Pulmonary effort is normal. No respiratory distress.      Breath sounds: Normal breath sounds. No wheezing or rales.   Abdominal:      General: Bowel sounds are normal. There is no distension.      Palpations: Abdomen is soft.      Tenderness: There is no abdominal tenderness. There is no guarding.   Musculoskeletal:         General: No tenderness or deformity. Normal range of motion.      Cervical back: Normal range of motion and neck supple.   Skin:     General: Skin is warm and dry.      Capillary Refill: Capillary refill takes less than 2 seconds.   Neurological:      Mental Status: He is alert. He is disoriented and confused.      Comments: Repetitive  speech   Psychiatric:         Behavior: Behavior normal.       Results Review:  I reviewed the patient's new clinical results.    Lab Results (last 24 hours)       Procedure Component Value Units Date/Time    CBC & Differential [[id number]]  (Abnormal) Collected: 08/02/23 1402    Specimen: Blood Updated: 08/02/23 1406    Narrative:      The following orders were created for panel order CBC & Differential.  Procedure                               Abnormality         Status                     ---------                               -----------         ------                     CBC Auto Differential[[id number]]        Abnormal            Final result                 Please view results for these tests on the individual orders.    Comprehensive Metabolic Panel [[id number]]  (Abnormal) Collected: 08/02/23 1402    Specimen: Blood Updated: 08/02/23 1429     Glucose 159 mg/dL      BUN 21 mg/dL      Creatinine 1.63 mg/dL      Sodium 136 mmol/L      Potassium 3.6 mmol/L      Chloride 101 mmol/L      CO2 24.2 mmol/L      Calcium 9.6 mg/dL      Total Protein 7.5 g/dL      Albumin 4.7 g/dL      ALT (SGPT) 16 U/L      AST (SGOT) 24 U/L      Alkaline Phosphatase 87 U/L      Total Bilirubin 0.5 mg/dL      Globulin 2.8 gm/dL      A/G Ratio 1.7 g/dL      BUN/Creatinine Ratio 12.9     Anion Gap 10.8 mmol/L      eGFR 47.3 mL/min/1.73     Narrative:      GFR Normal >60  Chronic Kidney Disease <60  Kidney Failure <15      CBC Auto Differential [[id number]]  (Abnormal) Collected: 08/02/23 1402    Specimen: Blood Updated: 08/02/23 1406     WBC 6.23 10*3/mm3      RBC 4.93 10*6/mm3      Hemoglobin 13.0 g/dL      Hematocrit 40.5 %      MCV 82.2 fL      MCH 26.4 pg      MCHC 32.1 g/dL      RDW 14.7 %      RDW-SD 44.2 fl      MPV 9.7 fL      Platelets 246 10*3/mm3      Neutrophil % 77.8 %      Lymphocyte % 15.7 %      Monocyte % 5.9 %      Eosinophil % 0.2 %      Basophil % 0.2 %      Immature Grans % 0.2 %      Neutrophils, Absolute 4.85  10*3/mm3      Lymphocytes, Absolute 0.98 10*3/mm3      Monocytes, Absolute 0.37 10*3/mm3      Eosinophils, Absolute 0.01 10*3/mm3      Basophils, Absolute 0.01 10*3/mm3      Immature Grans, Absolute 0.01 10*3/mm3     Single High Sensitivity Troponin T [[id number]]  (Normal) Collected: 08/02/23 1402    Specimen: Blood Updated: 08/02/23 1447     HS Troponin T 8 ng/L     Narrative:      High Sensitive Troponin T Reference Range:  <10.0 ng/L- Negative Female for AMI  <15.0 ng/L- Negative Male for AMI  >=10 - Abnormal Female indicating possible myocardial injury.  >=15 - Abnormal Male indicating possible myocardial injury.   Clinicians would have to utilize clinical acumen, EKG, Troponin, and serial changes to determine if it is an Acute Myocardial Infarction or myocardial injury due to an underlying chronic condition.         Ethanol [[id number]] Collected: 08/02/23 1402    Specimen: Blood Updated: 08/02/23 1429     Ethanol <10 mg/dL      Ethanol % <0.010 %     CK [[id number]]  (Abnormal) Collected: 08/02/23 1402    Specimen: Blood Updated: 08/02/23 1632     Creatine Kinase 492 U/L     Lactic Acid, Plasma [[id number]]  (Normal) Collected: 08/02/23 1419    Specimen: Blood Updated: 08/02/23 1437     Lactate 1.0 mmol/L     Ammonia [[id number]]  (Normal) Collected: 08/02/23 1419    Specimen: Blood Updated: 08/02/23 1619     Ammonia 36 umol/L     Levetiracetam Level (Keppra) [[id number]] Collected: 08/02/23 1509    Specimen: Blood Updated: 08/02/23 1509    Urinalysis With Culture If Indicated - Urine, Clean Catch [[id number]]  (Abnormal) Collected: 08/02/23 1613    Specimen: Urine, Clean Catch Updated: 08/02/23 1618     Color, UA Yellow     Appearance, UA Clear     pH, UA 6.5     Specific Gravity, UA 1.010     Glucose, UA Negative     Ketones, UA Negative     Bilirubin, UA Negative     Blood, UA Negative     Protein,  mg/dL (2+)     Leuk Esterase, UA Negative     Nitrite, UA Negative     Urobilinogen, UA 0.2 E.U./dL     Narrative:      In absence of clinical symptoms, the presence of pyuria, bacteria, and/or nitrites on the urinalysis result does not correlate with infection.    Urine Drug Screen - Urine, Clean Catch [[id number]]  (Normal) Collected: 08/02/23 1613    Specimen: Urine, Clean Catch Updated: 08/02/23 1628     THC, Screen, Urine Negative     Phencyclidine (PCP), Urine Negative     Cocaine Screen, Urine Negative     Methamphetamine, Ur Negative     Opiate Screen Negative     Amphetamine Screen, Urine Negative     Benzodiazepine Screen, Urine Negative     Tricyclic Antidepressants Screen Negative     Methadone Screen, Urine Negative     Barbiturates Screen, Urine Negative     Oxycodone Screen, Urine Negative     Propoxyphene Screen Negative     Buprenorphine, Screen, Urine Negative    Narrative:      Cutoff For Drugs Screened:    Amphetamines               500 ng/ml  Barbiturates               200 ng/ml  Benzodiazepines            150 ng/ml  Cocaine                    150 ng/ml  Methadone                  200 ng/ml  Opiates                    100 ng/ml  Phencyclidine               25 ng/ml  THC                            50 ng/ml  Methamphetamine            500 ng/ml  Tricyclic Antidepressants  300 ng/ml  Oxycodone                  100 ng/ml  Propoxyphene               300 ng/ml  Buprenorphine               10 ng/ml    The normal value for all drugs tested is negative. This report includes unconfirmed screening results, with the cutoff values listed, to be used for medical treatment purposes only.  Unconfirmed results must not be used for non-medical purposes such as employment or legal testing.  Clinical consideration should be applied to any drug of abuse test, particularly when unconfirmed results are used.      Urinalysis, Microscopic Only - Urine, Clean Catch [[id number]] Collected: 08/02/23 1613    Specimen: Urine, Clean Catch Updated: 08/02/23 2018     RBC, UA 0-2 /HPF      WBC, UA 0-2 /HPF      Comment: Urine  culture not indicated.        Bacteria, UA None Seen /HPF      Squamous Epithelial Cells, UA 0-2 /HPF      Hyaline Casts, UA 0-2 /LPF      Methodology Automated Microscopy            Imaging Results (Last 24 Hours)       Procedure Component Value Units Date/Time    CT Angiogram Head [[id number]] Collected: 08/02/23 1531     Updated: 08/02/23 1532    Narrative:      CT ANGIOGRAM NECK AND HEAD WITH CONTRAST     HISTORY: Confusion, expressive aphasia, seizure.     COMPARISON: CT angiogram neck and head 09/07/2021 and MRI brain  09/07/2021.     Initially, a noncontrasted CT examination of the brain was performed.  Encephalomalacia is appreciated involving the right frontal lobe  anteriorly measuring approximately 2.5 cm in the transverse dimension.  This was present previously. Confluent areas of decreased attenuation  are appreciated involving the white matter of the cerebral hemispheres  bilaterally consistent with extensive small vessel ischemic disease.  There is no evidence of hemorrhage or of a focal area of decreased  attenuation to suggest acute infarction. The prior CT examination of  09/07/2021 demonstrated a peripherally-enhancing lesion involving the  left parietal lobe laterally measuring approximately 17 mm in the  transverse dimension. This is not appreciated on the current  examination.     A CT angiogram of the neck and head was then performed. Multiplanar as  well as 3-dimensional reconstructions were generated.     FINDINGS: The great vessels are arranged in a classic configuration.  There is 0% stenosis of the internal carotid arteries using NASCET  criteria. The right A1 segment is hypoplastic. Otherwise, the proximal  aspects of the anterior and middle cerebral arteries appear  unremarkable.     The vertebral arteries are codominant. The left vertebral artery arises  from the aortic arch. The cervical segments are of relatively uniform  caliber. The basilar artery demonstrates a fenestration  proximally.  Otherwise, the basilar artery appears unremarkable. There is a fetal  origin of the right posterior cerebral artery. A moderate-sized  posterior communicating artery is present on the left.     A standard postcontrast CT examination of the brain showed no evidence  of abnormal enhancement.       Impression:      1.  Encephalomalacia involving the right frontal lobe anteriorly is  noted, unchanged. There is extensive small vessel ischemic disease.  2.  The prior CT angiogram of 09/21/2021 demonstrated a  peripherally-enhancing lesion involving the left parietal lobe laterally  measuring approximately 17 mm in size. This is not appreciated on the  current examination. Further evaluation could be performed with an MRI  examination of the brain with and without contrast.  3.  There is 0% stenosis involving the carotid bifurcations using NASCET  criteria. There is no evidence of a proximal intracranial high-grade  stenosis or occlusion.     NOTE: This is a preliminary report. The three-dimensional  reconstructions are not yet available for review.           Radiation dose reduction techniques were utilized, including automated  exposure control and exposure modulation based on body size.          CT Angiogram Neck [[id number]] Collected: 08/02/23 1531     Updated: 08/02/23 1532    Narrative:      CT ANGIOGRAM NECK AND HEAD WITH CONTRAST     HISTORY: Confusion, expressive aphasia, seizure.     COMPARISON: CT angiogram neck and head 09/07/2021 and MRI brain  09/07/2021.     Initially, a noncontrasted CT examination of the brain was performed.  Encephalomalacia is appreciated involving the right frontal lobe  anteriorly measuring approximately 2.5 cm in the transverse dimension.  This was present previously. Confluent areas of decreased attenuation  are appreciated involving the white matter of the cerebral hemispheres  bilaterally consistent with extensive small vessel ischemic disease.  There is no evidence of  hemorrhage or of a focal area of decreased  attenuation to suggest acute infarction. The prior CT examination of  09/07/2021 demonstrated a peripherally-enhancing lesion involving the  left parietal lobe laterally measuring approximately 17 mm in the  transverse dimension. This is not appreciated on the current  examination.     A CT angiogram of the neck and head was then performed. Multiplanar as  well as 3-dimensional reconstructions were generated.     FINDINGS: The great vessels are arranged in a classic configuration.  There is 0% stenosis of the internal carotid arteries using NASCET  criteria. The right A1 segment is hypoplastic. Otherwise, the proximal  aspects of the anterior and middle cerebral arteries appear  unremarkable.     The vertebral arteries are codominant. The left vertebral artery arises  from the aortic arch. The cervical segments are of relatively uniform  caliber. The basilar artery demonstrates a fenestration proximally.  Otherwise, the basilar artery appears unremarkable. There is a fetal  origin of the right posterior cerebral artery. A moderate-sized  posterior communicating artery is present on the left.     A standard postcontrast CT examination of the brain showed no evidence  of abnormal enhancement.       Impression:      1.  Encephalomalacia involving the right frontal lobe anteriorly is  noted, unchanged. There is extensive small vessel ischemic disease.  2.  The prior CT angiogram of 09/21/2021 demonstrated a  peripherally-enhancing lesion involving the left parietal lobe laterally  measuring approximately 17 mm in size. This is not appreciated on the  current examination. Further evaluation could be performed with an MRI  examination of the brain with and without contrast.  3.  There is 0% stenosis involving the carotid bifurcations using NASCET  criteria. There is no evidence of a proximal intracranial high-grade  stenosis or occlusion.     NOTE: This is a preliminary report.  The three-dimensional  reconstructions are not yet available for review.           Radiation dose reduction techniques were utilized, including automated  exposure control and exposure modulation based on body size.          XR Chest 1 View [[id number]] Collected: 08/02/23 1435     Updated: 08/02/23 1439    Narrative:      XR CHEST 1 VW-     HISTORY: Male who is 63 years-old, stroke     TECHNIQUE: Frontal view of the chest     COMPARISON: 9/7/2021     FINDINGS: The heart size is borderline. Aorta is tortuous. Pulmonary  vasculature is unremarkable. No focal pulmonary consolidation, pleural  effusion, or pneumothorax. No acute osseous process.       Impression:      No focal pulmonary consolidation. Borderline heart size.  Tortuous aorta. Follow-up as clinically indicated.     This report was finalized on 8/2/2023 2:36 PM by Dr. Tray Durán M.D.               Results for orders placed during the hospital encounter of 09/07/21    Adult Transthoracic Echo Complete W/ Cont if Necessary Per Protocol    Interpretation Summary  ú Left ventricular wall thickness is consistent with borderline concentric hypertrophy.  ú Calculated left ventricular EF = 59.8% Estimated left ventricular EF was in agreement with the calculated left ventricular EF. Left ventricular systolic function is normal.  ú Left ventricular diastolic function was normal.  ú Saline test results are negative.      ECG 12 Lead ED Triage Standing Order; Stroke (Onset >12 hrs)   Final Result   HEART RATE= 73  bpm   RR Interval= 822  ms   TN Interval= 183  ms   P Horizontal Axis= -26  deg   P Front Axis= 50  deg   QRSD Interval= 88  ms   QT Interval= 385  ms   QRS Axis= -12  deg   T Wave Axis= -6  deg   - BORDERLINE ECG -   Sinus rhythm   RSR' in V1 or V2, probably normal variant   Borderline T abnormalities, inferior leads   Since prior tracing INFERIOR T ABNORMALITIES ARE MORE PRONOUNCED   Electronically Signed By: Quique De La Paz ([facility]) 02-Aug-2023  18:11:37   Date and Time of Study: 2023-08-02 15:31:52           Assessment/Plan     Active Hospital Problems    Diagnosis  POA    **Altered mental status [R41.82]  Yes    Non-traumatic rhabdomyolysis [M62.82]  Yes    History of stroke [Z86.73]  Not Applicable    History of seizure [Z87.898]  Yes    CAITLIN (acute kidney injury) [N17.9]  Yes      Resolved Hospital Problems   No resolved problems to display.       Mr. Juice Salvador is a 63 y.o. smoker with a history of previous stroke and focal seizures with Nilton's paralysis who presents with altered mental status.    Altered mental status  -CT angio shows encephalomalacia along the right frontal lobe that is unchanged.  Nothing acute.  Spoke with bedside RN who spoke with the family prior to my arrival.  Apparently, patient fell 2 years ago off a roof in [facility].  He had some sort of brain injury but the family was unsure if his diagnoses.  They do endorse a history of seizures.  They also reported that patient becomes weak in his lower extremities and just passes out at home regularly for the last several weeks.  On exam, patient is very repetitive and is very concerned about returning to work.  He currently works at [employer] 6 days a week.  -Neurology consult in the a.m.  -MRI in a.m. Of note, patient had an abnormal MRI back in September 2021 which suspected a left parietal cavernous malfunction.  He had instructions to follow-up with neurosurgery for further imaging and it appears that he canceled all of his appointments.    -PT/OT/ST to treat and eval  -2D echo in a.m.  -ASA and Lipitor  -Check CMP, CBC, hemoglobin A1c, lipid panel, and TSH in a.m.    Nontraumatic mild rhabdomyolysis  -IV fluids overnight  -Repeat CK in a.m.    CAITLIN   -Suspect secondary to the rhabdomyolysis.    -IV hydration overnight  -Avoid nephrotoxins  -Trend BMP    History of focal seizures with Nilton's paralysis  -Continue Keppra twice daily  -Seizure precautions        Tobacco  abuse  -Family reports history of heavy smoking.    -Nicotine patch daily  -Encourage cessation    I discussed the patient's findings and my recommendations with patient, family, and nursing.    VTE Prophylaxis - SCDs.  Code Status - Full code.       ADONAY Nagel  [facility]  08/03/23  02:25 EDT

## 2023-08-03 NOTE — CONSULTS
NEUROLOGY CONSULT     DOS: 8/3/2023  NAME: Winston Salvador   : 1960  PCP: Provider, No Known  CC: Stroke  Referring MD: Provider, No Known      Neurological Problem and Interval History:   63 y.o. who presents with Sx of transient altered mental status.  Patient was found by family member speech changes altered mental status was having hallucinations.  Patient was also noted to have repetitive speech as well as episodes of syncope x weeks.  Patient has a history of seizure, traumatic brain injury, stroke with right frontal and left parietal encephalomalacia.  In speaking with the patient he has been noncompliant with Keppra.  He was last seen in neurology clinic in follow-up in .  At that time he was on Keppra 1 g twice daily.  Patient has since resolved to his neurologic baseline.  This likely represents breakthrough seizure in the setting of noncompliance.  Patient has other significant risk factors and history of stroke.  Patient denies alcohol use.      Past Medical/Surgical Hx:  Past Medical History:   Diagnosis Date    Acute gout of left hand 9/10/2021    CAITLIN (acute kidney injury) 2021    Stroke      Past Surgical History:   Procedure Laterality Date    APPENDECTOMY           Medications On Admission  Medications Prior to Admission   Medication Sig Dispense Refill Last Dose    ibuprofen (ADVIL,MOTRIN) 800 MG tablet Take 1 tablet by mouth Every 8 (Eight) Hours As Needed for Moderate Pain . 30 tablet 0 Patient Taking Differently    levETIRAcetam (KEPPRA) 1000 MG tablet Take 1 tablet by mouth Every 12 (Twelve) Hours for 30 days. 60 tablet 0        Allergies:  No Known Allergies    Social Hx:  Social History     Socioeconomic History    Marital status: Single   Tobacco Use    Smoking status: Every Day     Packs/day: 1.00     Years: 15.00     Pack years: 15.00     Types: Cigarettes    Smokeless tobacco: Never   Vaping Use    Vaping Use: Never used   Substance and Sexual Activity     Alcohol use: Yes     Alcohol/week: 7.0 standard drinks     Types: 6 Cans of beer, 1 Shots of liquor per week     Comment: occasional    Drug use: Never    Sexual activity: Defer       Family Hx:  Family History   Problem Relation Age of Onset    No Known Problems Mother     No Known Problems Father        Review of Imaging (Interpretation of images not reports):  -Head CT/CTAs:   1.  Encephalomalacia involving the right frontal lobe anteriorly is  noted, unchanged. There is extensive small vessel ischemic disease.  2.  The prior CT angiogram of 09/21/2021 demonstrated a  peripherally-enhancing lesion involving the left parietal lobe laterally  measuring approximately 17 mm in size. This is not appreciated on the  current examination. Further evaluation could be performed with an MRI  examination of the brain with and without contrast.  3.  There is 0% stenosis involving the carotid bifurcations using NASCET  criteria. There is no evidence of a proximal intracranial high-grade  stenosis or occlusion. There is an isolated posterior circulation. The  P1 segments are hypoplastic bilaterally. The distal aspect of the right  vertebral artery is hypoplastic.       Laboratory Results:   Lab Results   Component Value Date    GLUCOSE 154 (H) 08/03/2023    CALCIUM 8.6 08/03/2023     08/03/2023    K 3.5 08/03/2023    CO2 22.7 08/03/2023     08/03/2023    BUN 18 08/03/2023    CREATININE 1.32 (H) 08/03/2023    EGFRIFAFRI 75 09/09/2021    EGFRIFNONA 62 09/09/2021    BCR 13.6 08/03/2023    ANIONGAP 10.3 08/03/2023     Lab Results   Component Value Date    WBC 5.99 08/03/2023    HGB 11.3 (L) 08/03/2023    HCT 34.4 (L) 08/03/2023    MCV 81.3 08/03/2023     08/03/2023     Lab Results   Component Value Date    CHOL 194 08/03/2023    CHOL 179 09/08/2021     Lab Results   Component Value Date    HDL 26 (L) 08/03/2023    HDL 28 (L) 09/08/2021     Lab Results   Component Value Date     (H) 08/03/2023      "(H) 09/08/2021     Lab Results   Component Value Date    TRIG 239 (H) 08/03/2023    TRIG 186 (H) 09/08/2021     Lab Results   Component Value Date    HGBA1C 6.50 (H) 08/03/2023     Lab Results   Component Value Date    INR 1.0 08/23/2022    INR 1.08 09/07/2021    PROTIME 10.4 08/23/2022    PROTIME 13.8 09/07/2021         Physical Examination:   BP (!) 130/115 (BP Location: Right arm, Patient Position: Lying)   Pulse 70   Temp 98.4 øF (36.9 øC) (Oral)   Resp 18   Ht 170.2 cm (67\")   Wt 85.1 kg (187 lb 9.6 oz)   SpO2 99%   BMI 29.38 kg/mý   General Appearance:   Well developed, well nourished, well groomed, alert, and cooperative.  HEENT: Normocephalic. Atraumatic. No JVD, no tracheal deviation.  Neck and Spine: Normal range of motion.  Normal alignment. No mass or tenderness. No bruits.     Peripheral Vasculature: Radial pulses are equal and symmetric. No signs of distal embolization.  Extremities:    No edema or deformities.   Skin:    No rashes or discoloration    Neurological examination:  Higher Integrative  Function: Oriented to time, place and person. Normal registration, recall, attention span and concentration. Normal language including comprehension, spontaneous speech, repetition, naming and vocabulary. No neglect. Normal fund of knowledge and higher integrative function.  CN II: Pupils are equal, round, and reactive to light. Blinks to visual threat and counts fingers in all fields  CN III IV VI: Extraocular movements are full without nystagmus.   CN V: Normal facial sensation   CN VII: Facial movements are symmetric. No labial dysarthria  CN VIII:   Auditory acuity is normal.  CN IX & X:   No palatal or pharnygeal dysarthria.  CN XI: Turns head without abnormality.   CN XII:   The tongue is midline.  No lingual dysarthria.   Motor: Normal muscle strength, bulk and tone in upper and lower extremities.  No fasciculations, rigidity, spasticity, or abnormal movements.  Reflexes: 2+ in the upper and " lower extremities. Plantar responses are flexor.  Sensation: Normal to light touch, temperature  Station and Gait: Deferred for bed rest    Coordination: Finger-to-nose test shows no dysmetria.  Rapid alternating movements are normal.  Heel-to-shin normal.      Diagnoses / Discussion:  63 y.o. who presents with Sx of transient altered mental status.  Patient was found by family member speech changes altered mental status was having hallucinations.  Patient was also noted to have repetitive speech as well as episodes of syncope x weeks.  Patient has a history of seizure, traumatic brain injury, stroke with right frontal and left parietal encephalomalacia.  In speaking with the patient he has been noncompliant with Keppra.  He was last seen in neurology clinic in follow-up in 2021.  At that time he was on Keppra 1 g twice daily.  Patient has since resolved to his neurologic baseline.  This likely represents breakthrough seizure in the setting of noncompliance.  Patient has other significant risk factors and history of stroke.  Patient denies alcohol use.    Plan:  -MRI  -TTE  -Head CT/CTA's unremarkable  -EEG  -A1c 6.5  -  -TSH elevated defer to the primary team  -B12, ammonia, folate  -Patient was noncompliant with Keppra at home.  His last dose from neurology clinic follow-up was Keppra 1 g twice daily, restarted.  -Follow-up in neurology clinic in 1 to 2 months  -Seizure Precautions: Patient is not to drive for at least 3 months until cleared by a physician, operate heavy machinery, take tub baths, swim unattended, climb heights, or perform any other activities that can bring harm to himself/herself or others during an episode of altered awareness.       I have discussed the above with the patient and family.  Time spent with patient: 60min    MDM  Reviewed: previous chart, nursing note and vitals  Reviewed previous: labs and CT scan  Interpretation: labs and CT scan  Total time providing critical care:  30-74 minutes. This excludes time spent performing separately reportable procedures and services.  Consults: neurology    Carla Bryson MD  Neurology

## 2023-08-03 NOTE — PLAN OF CARE
Goal Outcome Evaluation:         Orders received via stroke protocol.  Pt passed Dysphagia screening x 2, discussion with RNMonika, patient with no deficits with swallowing, cognition or communication as this time, MRI negative.  ST to sign off, please reconsult, if needed.

## 2023-08-03 NOTE — PLAN OF CARE
Problem: Fall Injury Risk  Goal: Absence of Fall and Fall-Related Injury  Outcome: Ongoing, Progressing  Intervention: Identify and Manage Contributors  Recent Flowsheet Documentation  Taken 8/3/2023 0500 by Mely Menchaca RN  Medication Review/Management: medications reviewed  Taken 8/3/2023 0300 by Mely Menchaca RN  Medication Review/Management: medications reviewed  Taken 8/3/2023 0100 by Mely Menchaca RN  Medication Review/Management: medications reviewed  Intervention: Promote Injury-Free Environment  Recent Flowsheet Documentation  Taken 8/3/2023 0500 by Mely Menchaca RN  Safety Promotion/Fall Prevention:   safety round/check completed   room organization consistent   nonskid shoes/slippers when out of bed   muscle strengthening facilitated   lighting adjusted   gait belt   fall prevention program maintained   clutter free environment maintained   assistive device/personal items within reach   activity supervised  Taken 8/3/2023 0300 by Mely Menchaca RN  Safety Promotion/Fall Prevention:   safety round/check completed   room organization consistent   nonskid shoes/slippers when out of bed   muscle strengthening facilitated   lighting adjusted   gait belt   fall prevention program maintained   clutter free environment maintained   assistive device/personal items within reach   activity supervised  Taken 8/3/2023 0100 by Mely Menchaca RN  Safety Promotion/Fall Prevention:   safety round/check completed   room organization consistent   nonskid shoes/slippers when out of bed   muscle strengthening facilitated   lighting adjusted   gait belt   fall prevention program maintained   clutter free environment maintained   assistive device/personal items within reach   activity supervised   Goal Outcome Evaluation:

## 2023-08-03 NOTE — PROGRESS NOTES
[facility] Acute Inpt Rehab Note     Referral received via stroke order set.   MRI negative for acute findings.  Will go ahead and sign off at this time.  Should patient display needs for our services, please call our office at [phone], to initiate a full referral once therapies begin and diagnosis made.    Thank you,   Nikki Crockett, RN   Rehab Admission Nurse

## 2023-08-03 NOTE — CASE MANAGEMENT/SOCIAL WORK
Discharge Planning Assessment  [facility]     Patient Name: Winston Salvador  MRN: [id number]  Today's Date: 8/3/2023    Admit Date: 8/2/2023    Plan: Home   Discharge Needs Assessment       Row Name 08/03/23 1521       Living Environment    People in Home alone    Current Living Arrangements apartment    Potentially Unsafe Housing Conditions none    Primary Care Provided by self    Provides Primary Care For no one, unable/limited ability to care for self    Family Caregiver if Needed child(ritesh), adult    Quality of Family Relationships supportive;helpful    Able to Return to Prior Arrangements yes       Transition Planning    Patient/Family Anticipates Transition to home    Transportation Anticipated family or friend will provide;car, drives self       Discharge Needs Assessment    Readmission Within the Last 30 Days no previous admission in last 30 days    Equipment Currently Used at Home none    Concerns to be Addressed discharge planning    Provided Post Acute Provider List? N/A    Provided Post Acute Provider Quality & Resource List? N/A    Current Discharge Risk lives alone                   Discharge Plan       Row Name 08/03/23 1523       Plan    Plan Home    Plan Comments S/W pt, his dtr in law Jalyn and tito Silverman at bedside, and pt's son Herrera on Facetime on pt's grandson's phone.  Pt's tito Silverman provided translation.  Pt lives alone in an apartment, is IADLs and can drive.  His son Goyo lives close and checks on him often.   Pt works full time at [employer] - he drives himself to work.  He does not use any home DME.  No hx of HH or SNF.  Pt does not have any health insurance.  [employer] love'gen pt - he is not a citizen or permanet resident, and is over income for any programs.  Pt does not have a PCP, but [facility] for [facility].  [facility] did give list of other health clinics in case needed.  [facility] encouraged pt to  followup at that clinic for followup care and meds.  He is enrolled in Meds to Bed. CCP will continue to follow and assist if needed. ..........Georgie PATTERSON/ ALTON                  Continued Care and Services - Admitted Since 8/2/2023    Coordination has not been started for this encounter.          Demographic Summary       Row Name 08/03/23 1518       General Information    Admission Type inpatient    Arrived From home    Referral Source admission list    Reason for Consult discharge planning    Preferred Language B[place]an                   Functional Status       Row Name 08/03/23 1519       Functional Status    Usual Activity Tolerance moderate       Functional Status, IADL    Medications independent    Meal Preparation independent;assistive person    Housekeeping independent;assistive person    Laundry independent;assistive person    Shopping independent;assistive person       Mental Status    General Appearance WDL WDL       Employment/    Employment Status employed full-time    Current or Previous Occupation service industry                               Georgie Wright RN

## 2023-08-03 NOTE — CONSULTS
Date of Hospital Visit: 2023  Date of consult: 23  Encounter Provider: Kendall Headley MD  Place of Service: [facility] CARDIOLOGY  Patient Name: Winston Salvador  :1960  Referral Provider: Provider, No Known    Chief complaint: Altered mental status    Reason for consult: Abnormal EKG    History of Present Illness    Mr Salvador is a 63 y.o. with a past medical history significant for CVA, seizures, HLD, and current smoker.    Patient has not been seen by a cardiologist since .  He was initially seen for chest pain. He had extensive cardiac workup to rule out myocardial infarction. There was no obvious cardiac or pulmonary process was identified. He was complaining of chest pain approximately every 8th day. He did get some relief with Pepcid. His EKG at that time was normal sinus rhythm.    He presented to the ED after for altered mental status. Patient currently lives alone and was found  by family with difficulty speaking, weird behavior, confusion, and hallucinations. The next day, he didn't show up to work, so the family members went to check on him again and he was still confused. In the ED, he showed expressive aphasia, confusion, and slurred speech.     ED Labs:  , HS troponin 8, creatinine 1.63    EKG shows:  Sinus rhythm  RSR' in V1 or V2, probably normal variant  Borderline T abnormalities, inferior leads  Since prior tracing INFERIOR T ABNORMALITIES ARE MORE PRONOUNCED    I have been asked to see this patient for abnormal EKG          ECHO 9/10/21  Left ventricular wall thickness is consistent with borderline concentric hypertrophy.  Calculated left ventricular EF = 59.8% Estimated left ventricular EF was in agreement with the calculated left ventricular EF. Left ventricular systolic function is normal.  Left ventricular diastolic function was normal.  Saline test results are negative.    Past Medical History:   Diagnosis Date     Acute gout of left hand 9/10/2021    CAITLIN (acute kidney injury) 9/7/2021    Stroke        Past Surgical History:   Procedure Laterality Date    APPENDECTOMY         Medications Prior to Admission   Medication Sig Dispense Refill Last Dose    ibuprofen (ADVIL,MOTRIN) 800 MG tablet Take 1 tablet by mouth Every 8 (Eight) Hours As Needed for Moderate Pain . 30 tablet 0 Patient Taking Differently    levETIRAcetam (KEPPRA) 1000 MG tablet Take 1 tablet by mouth Every 12 (Twelve) Hours for 30 days. 60 tablet 0        Current Meds  Scheduled Meds:aspirin, 325 mg, Oral, Daily   Or  aspirin, 300 mg, Rectal, Daily  atorvastatin, 80 mg, Oral, Nightly  famotidine, 20 mg, Oral, BID AC  levETIRAcetam, 1,000 mg, Intravenous, Q12H  nicotine, 1 patch, Transdermal, Q24H  senna-docusate sodium, 2 tablet, Oral, BID      Continuous Infusions:sodium chloride, 75 mL/hr, Last Rate: 75 mL/hr (08/03/23 0119)      PRN Meds:.  acetaminophen **OR** acetaminophen **OR** acetaminophen    acetaminophen **OR** acetaminophen    aluminum-magnesium hydroxide-simethicone    senna-docusate sodium **AND** polyethylene glycol **AND** bisacodyl **AND** bisacodyl    labetalol    nitroglycerin    ondansetron **OR** ondansetron    sodium chloride    sodium chloride    Allergies as of 08/02/2023    (No Known Allergies)       Social History     Socioeconomic History    Marital status: Single   Tobacco Use    Smoking status: Every Day     Packs/day: 1.00     Years: 15.00     Pack years: 15.00     Types: Cigarettes    Smokeless tobacco: Never   Vaping Use    Vaping Use: Never used   Substance and Sexual Activity    Alcohol use: Yes     Alcohol/week: 7.0 standard drinks     Types: 6 Cans of beer, 1 Shots of liquor per week     Comment: occasional    Drug use: Never    Sexual activity: Defer       Family History   Problem Relation Age of Onset    No Known Problems Mother     No Known Problems Father        REVIEW OF SYSTEMS:   All systems reviewed and pertinent  "positives include in HPI otherwise negative review of systems.       Objective:   Temp:  [97.9 øF (36.6 øC)-98.7 øF (37.1 øC)] 98.4 øF (36.9 øC)  Heart Rate:  [67-85] 70  Resp:  [14-18] 18  BP: (111-144)/() 130/115  Body mass index is 29.38 kg/mý.  Flowsheet Rows      Flowsheet Row First Filed Value   Admission Height 170.2 cm (67\") Documented at 08/02/2023 1355   Admission Weight 85.1 kg (187 lb 9.6 oz) Documented at 08/02/2023 1355          Vitals:    08/03/23 1125   BP: (!) 130/115   Pulse: 70   Resp: 18   Temp: 98.4 øF (36.9 øC)   SpO2: 99%       General Appearance:    Alert, cooperative, in no acute distress   Head:    Normocephalic, without obvious abnormality, atraumatic   Eyes:            Lids and lashes normal, conjunctivae and sclerae normal, no   icterus, no pallor, corneas clear, PERRLA   Ears:    Ears appear intact with no abnormalities noted   Throat:   No oral lesions, no thrush, oral mucosa moist   Neck:   No adenopathy, supple, trachea midline, no thyromegaly, no   carotid bruit, no JVD   Back:     No kyphosis present, no scoliosis present, no skin lesions, erythema or scars, no tenderness to percussion or palpation, range of motion normal   Lungs:     Clear to auscultation,respirations regular, even and unlabored    Heart:    Regular rhythm and normal rate, normal S1 and S2, no murmur, no gallop, no rub, no click   Chest Wall:    No abnormalities observed   Abdomen:     Normal bowel sounds, no masses, no organomegaly, soft nontender, nondistended, no guarding, no rebound  tenderness   Extremities:   Moves all extremities well, no edema, no cyanosis, no redness   Pulses:   Pulses palpable and equal bilaterally. Normal radial, carotid, femoral, dorsalis pedis and posterior tibial pulses bilaterally. Normal abdominal aorta   Skin:  Neurology:   Psychiatric:   No bleeding, bruising or rash   Normal speech and cranial nerve exam, no focal deficit   Alert and oriented x 3, normal mood and affect "             Review of Data:      Results from last 7 days   Lab Units 08/03/23  0512   SODIUM mmol/L 139   POTASSIUM mmol/L 3.5   CHLORIDE mmol/L 106   CO2 mmol/L 22.7   BUN mg/dL 18   CREATININE mg/dL 1.32*   CALCIUM mg/dL 8.6   BILIRUBIN mg/dL <0.2   ALK PHOS U/L 71   ALT (SGPT) U/L 15   AST (SGOT) U/L 22   GLUCOSE mg/dL 154*     Results from last 7 days   Lab Units 08/02/23  1402   CK TOTAL U/L 492*   HSTROP T ng/L 8     @LABRCNTbnp@  Results from last 7 days   Lab Units 08/03/23  0512 08/02/23  1402   WBC 10*3/mm3 5.99 6.23   HEMOGLOBIN g/dL 11.3* 13.0   HEMATOCRIT % 34.4* 40.5   PLATELETS 10*3/mm3 212 246             @LABRCNTIP(chol,trig,hdl,ldl)    8/2/23 9/7/21      I personally viewed and interpreted the patient's EKG/Telemetry data  )   Altered mental status    History of stroke    History of seizure    CAITLIN (acute kidney injury)    Non-traumatic rhabdomyolysis        Assessment and Plan:    Abnormal EKG reported with T wave inversion on lead III-no other EKG abnormalities.  Patient is chest pain-free and with negative troponin.  Serial EKG showed no significant changes.  This probably incidental finding without any clinical significance. No further inpatient cardiac testing recommended at this point.  Continue other care per primary team and neurology.    Follow up in cardiology clinic in 3 months        Kendall Headley MD  08/03/23  14:04 EDT.      Time spent in reviewing chart, discussion and examination:

## 2023-08-03 NOTE — THERAPY EVALUATION
Patient Name: Winston Salvador  : 1960    MRN: [id number]                              Today's Date: 8/3/2023       Admit Date: 2023    Visit Dx:     ICD-10-CM ICD-9-CM   1. Altered mental status, unspecified altered mental status type  R41.82 780.97     Patient Active Problem List   Diagnosis    Post-ictal aphasia    Seizure    Nilton's paralysis    Abnormal brain CT    History of stroke    History of seizure    CAITLIN (acute kidney injury)    Acute gout of left hand    Altered mental status    Non-traumatic rhabdomyolysis     Past Medical History:   Diagnosis Date    Acute gout of left hand 9/10/2021    CAITLIN (acute kidney injury) 2021    Stroke      Past Surgical History:   Procedure Laterality Date    APPENDECTOMY        General Information       Row Name 23 1536          OT Time and Intention    Document Type discharge evaluation/summary  -     Mode of Treatment occupational therapy;individual therapy  -       Row Name 23 1536          General Information    Patient Profile Reviewed yes  -SM     Prior Level of Function independent:;ADL's;work;community mobility  works at [employer]  -     Existing Precautions/Restrictions no known precautions/restrictions  -     Barriers to Rehab language barrier  pt speaks Maori, OT offered to use  phone in the room but pt requests to have family present translate  -       Row Name 23 1536          Living Environment    People in Home alone  -       Row Name 23 1536          Cognition    Orientation Status (Cognition) oriented x 4  family report pt is not confused any longer  -               User Key  (r) = Recorded By, (t) = Taken By, (c) = Cosigned By      Initials Name Provider Type     Sulema Sarmiento OT Occupational Therapist                     Mobility/ADL's       Row Name 23 1537          Transfers    Transfers sit-stand transfer  -       Row Name 23 1537          Sit-Stand  Transfer    Sit-Stand [place] (Transfers) independent  -SM       Row Name 08/03/23 1537          Functional Mobility    Functional Mobility- Ind. Level independent  -     Functional Mobility- Comment around room without difficulty  -SM       Row Name 08/03/23 1537          Activities of Daily Living    BADL Assessment/Intervention lower body dressing;feeding  -SM       Row Name 08/03/23 1537          Lower Body Dressing Assessment/Training    [place] Level (Lower Body Dressing) don;socks;independent  -SM       Row Name 08/03/23 1537          Self-Feeding Assessment/Training    [place] Level (Feeding) feeding skills;independent  -     Position (Self-Feeding) sitting up in bed  -               User Key  (r) = Recorded By, (t) = Taken By, (c) = Cosigned By      Initials Name Provider Type    Sulema Kaur OT Occupational Therapist                   Obj/Interventions       Row Name 08/03/23 1538          Sensory Assessment (Somatosensory)    Sensory Assessment (Somatosensory) UE sensation intact  -SM       Row Name 08/03/23 1538          Vision Assessment/Intervention    Visual Impairment/Limitations WFL  -SM       Row Name 08/03/23 1538          Range of Motion Comprehensive    General Range of Motion bilateral upper extremity ROM L  -SM       Row Name 08/03/23 1538          Strength Comprehensive (MMT)    Comment, General Manual Muscle Testing (MMT) Assessment BUE 5/5 MMT  -SM       Row Name 08/03/23 1538          Motor Skills    Motor Skills coordination  -     Coordination [facility]  -SM       Row Name 08/03/23 1538          Balance    Balance Assessment sitting static balance;standing static balance;standing dynamic balance  -     Static Sitting Balance independent  -     Position, Sitting Balance sitting edge of bed  -     Static Standing Balance independent  -     Dynamic Standing Balance independent  -     Position/Device Used, Standing Balance unsupported  -                User Key  (r) = Recorded By, (t) = Taken By, (c) = Cosigned By      Initials Name Provider Type     Sulema Sarmiento OT Occupational Therapist                   Goals/Plan       Row Name 08/03/23 1541          Problem Specific Goal 1 (OT)    Problem Specific Goal 1 (OT) Pt to demonstrate safe technique for ADLs and transfers without hands on assist in prep to safe dc home.  -     Time Frame (Problem Specific Goal 1, OT) short term goal (STG);by discharge  -     Progress/Outcome (Problem Specific Goal 1, OT) goal met  -               User Key  (r) = Recorded By, (t) = Taken By, (c) = Cosigned By      Initials Name Provider Type     Sulema Sarmiento OT Occupational Therapist                   Clinical Impression       [facility] Name 08/03/23 1538          Pain Assessment    Additional Documentation Pain Scale: FACES Pre/Post-Treatment (Group)  -SM       Row Name 08/03/23 1537          Pain Scale: FACES Pre/Post-Treatment    Pain: FACES Scale, Pretreatment 0-->no hurt  -     Posttreatment Pain Rating 0-->no hurt  -[facility] Name 08/03/23 1537          Plan of Care Review    Plan of Care Reviewed With patient;family  -     Outcome Evaluation Pt is a 63 y.o male admitted from home after being found by family with difficulty speaking, confusion, abnormal behaviour/hallucinations. He has hx of seizure, traumatic brain injury, stroke with right frontal and left parietal encephalomalacia. Per neurology he has been noncomplient with seizure medications. Today OT observered pt sitting EOB feeding self without difficulty. He was steady on his feet with mobility in room and able to complete ADLs without difficult. Pt and family report they feel pt is back to his baseline. No OT needs at this time.  -       Row Name 08/03/23 1538          Therapy Assessment/Plan (OT)    Therapy Frequency (OT) evaluation only  -       Row Name 08/03/23 1538          Therapy Plan Review/Discharge Plan (OT)    Anticipated  Discharge Disposition (OT) home  -       Row Name 08/03/23 1538          Positioning and Restraints    Pre-Treatment Position in bed  -SM     Post Treatment Position bed  -SM     In Bed sitting EOB;exit alarm on;encouraged to call for assist;notified nsg;call light within reach;with family/caregiver  -               User Key  (r) = Recorded By, (t) = Taken By, (c) = Cosigned By      Initials Name Provider Type    Sulema Kaur OT Occupational Therapist                   Outcome Measures       Row Name 08/03/23 1542          How much help from another is currently needed...    Putting on and taking off regular lower body clothing? 4  -SM     Bathing (including washing, rinsing, and drying) 4  -SM     Toileting (which includes using toilet bed pan or urinal) 4  -SM     Putting on and taking off regular upper body clothing 4  -SM     Taking care of personal grooming (such as brushing teeth) 4  -SM     Eating meals 4  -SM     AM-PAC 6 Clicks Score (OT) 24  -SM       Row Name 08/03/23 1542          Functional Assessment    Outcome Measure Options AM-PAC 6 Clicks Daily Activity (OT)  -               User Key  (r) = Recorded By, (t) = Taken By, (c) = Cosigned By      Initials Name Provider Type    Sulema Kaur OT Occupational Therapist                    Occupational Therapy Education       Title: PT OT SLP Therapies (In Progress)       Topic: Occupational Therapy (In Progress)       Point: ADL training (Done)       Description:   Instruct learner(s) on proper safety adaptation and remediation techniques during self care or transfers.   Instruct in proper use of assistive devices.                  Learning Progress Summary             Patient Acceptance, E, VU by  at 8/3/2023 1542    Comment: safety with ADLs while admitted and recommended getting up with nsg and mobilizing in halls with nsg or family to increase OOB activity   Family Acceptance, E, VU by  at 8/3/2023 1542    Comment: safety with  ADLs while admitted and recommended getting up with nsg and mobilizing in halls with nsg or family to increase OOB activity                         Point: Home exercise program (Not Started)       Description:   Instruct learner(s) on appropriate technique for monitoring, assisting and/or progressing therapeutic exercises/activities.                  Learner Progress:  Not documented in this visit.              Point: Precautions (Not Started)       Description:   Instruct learner(s) on prescribed precautions during self-care and functional transfers.                  Learner Progress:  Not documented in this visit.              Point: Body mechanics (Not Started)       Description:   Instruct learner(s) on proper positioning and spine alignment during self-care, functional mobility activities and/or exercises.                  Learner Progress:  Not documented in this visit.                              User Key       Initials Effective Dates Name Provider Type Discipline     04/02/20 -  Sulema Sarmiento OT Occupational Therapist OT                  OT Recommendation and Plan  Therapy Frequency (OT): evaluation only  Plan of Care Review  Plan of Care Reviewed With: patient, family  Outcome Evaluation: Pt is a 63 y.o male admitted from home after being found by family with difficulty speaking, confusion, abnormal behaviour/hallucinations. He has hx of seizure, traumatic brain injury, stroke with right frontal and left parietal encephalomalacia. Per neurology he has been noncomplient with seizure medications. Today OT observered pt sitting EOB feeding self without difficulty. He was steady on his feet with mobility in room and able to complete ADLs without difficult. Pt and family report they feel pt is back to his baseline. No OT needs at this time.     Time Calculation:   Evaluation Complexity (OT)  Review Occupational Profile/Medical/Therapy History Complexity: brief/low complexity  Assessment, Occupational  Performance/Identification of Deficit Complexity: 1-3 performance deficits  Clinical Decision Making Complexity (OT): problem focused assessment/low complexity  Overall Complexity of Evaluation (OT): low complexity     Time Calculation- OT       Row Name 08/03/23 1543             Time Calculation- OT    OT Start Time 1518  -SM      OT Stop Time 1528  -SM      OT Time Calculation (min) 10 min  -SM         Untimed Charges    OT Eval/Re-eval Minutes 10  -SM         Total Minutes    Untimed Charges Total Minutes 10  -SM       Total Minutes 10  -SM                User Key  (r) = Recorded By, (t) = Taken By, (c) = Cosigned By      Initials Name Provider Type     Sulema Sarmiento OT Occupational Therapist                  Therapy Charges for Today       Code Description Service Date Service Provider Modifiers Qty    92747772895 HC OT EVAL LOW COMPLEXITY 2 8/3/2023 Sulema Sarmiento OT GO 1                 Sulema Sarmiento OT  8/3/2023

## 2023-08-03 NOTE — SIGNIFICANT NOTE
08/03/23 1638   OTHER   Discipline physical therapist   Rehab Time/Intention   Session Not Performed patient unavailable for evaluation  (Pt down for EEG most of afternoon and this PT unable to return. Per OT and family pt appears back to his baseline and OT signed off. PT will f/u with pt next service date for eval as appropriate.)   Recommendation   PT - Next Appointment 08/04/23

## 2023-08-03 NOTE — PLAN OF CARE
Goal Outcome Evaluation:                 Pt A&Ox4. On room air. Pt able to answer orientation questions correctly. Pt reported having trouble with memory. Rn provided education on prevention of falls, use of call bell, Importance of seizure medications. NIH score 0 this AM.

## 2023-08-04 ENCOUNTER — APPOINTMENT (OUTPATIENT)
Dept: CARDIOLOGY | Facility: HOSPITAL | Age: 63
DRG: 101 | End: 2023-08-04

## 2023-08-04 LAB
ALBUMIN SERPL-MCNC: 3.3 G/DL (ref 3.5–5.2)
ANION GAP SERPL CALCULATED.3IONS-SCNC: 11 MMOL/L (ref 5–15)
ANION GAP SERPL CALCULATED.3IONS-SCNC: 7.9 MMOL/L (ref 5–15)
AORTIC DIMENSIONLESS INDEX: 0.9 (DI)
BH CV ECHO MEAS - AO MAX PG: 5.1 MMHG
BH CV ECHO MEAS - AO MEAN PG: 3.1 MMHG
BH CV ECHO MEAS - AO V2 MAX: 113.1 CM/SEC
BH CV ECHO MEAS - AO V2 VTI: 22.1 CM
BH CV ECHO MEAS - AVA(I,D): 3.6 CM2
BH CV ECHO MEAS - EDV(CUBED): 94.9 ML
BH CV ECHO MEAS - EDV(MOD-SP2): 83 ML
BH CV ECHO MEAS - EDV(MOD-SP4): 143 ML
BH CV ECHO MEAS - EF(MOD-BP): 56.7 %
BH CV ECHO MEAS - EF(MOD-SP2): 54.2 %
BH CV ECHO MEAS - EF(MOD-SP4): 56.6 %
BH CV ECHO MEAS - ESV(CUBED): 28.7 ML
BH CV ECHO MEAS - ESV(MOD-SP2): 38 ML
BH CV ECHO MEAS - ESV(MOD-SP4): 62 ML
BH CV ECHO MEAS - FS: 32.9 %
BH CV ECHO MEAS - IVS/LVPW: 1.01 CM
BH CV ECHO MEAS - IVSD: 1.02 CM
BH CV ECHO MEAS - LAT PEAK E' VEL: 10.1 CM/SEC
BH CV ECHO MEAS - LV DIASTOLIC VOL/BSA (35-75): 72.8 CM2
BH CV ECHO MEAS - LV MASS(C)D: 159.9 GRAMS
BH CV ECHO MEAS - LV MAX PG: 4.3 MMHG
BH CV ECHO MEAS - LV MEAN PG: 2.19 MMHG
BH CV ECHO MEAS - LV SYSTOLIC VOL/BSA (12-30): 31.5 CM2
BH CV ECHO MEAS - LV V1 MAX: 104.2 CM/SEC
BH CV ECHO MEAS - LV V1 VTI: 19.5 CM
BH CV ECHO MEAS - LVIDD: 4.6 CM
BH CV ECHO MEAS - LVIDS: 3.1 CM
BH CV ECHO MEAS - LVOT AREA: 4.1 CM2
BH CV ECHO MEAS - LVOT DIAM: 2.29 CM
BH CV ECHO MEAS - LVPWD: 1.01 CM
BH CV ECHO MEAS - MED PEAK E' VEL: 5.9 CM/SEC
BH CV ECHO MEAS - MV A DUR: 0.15 SEC
BH CV ECHO MEAS - MV A MAX VEL: 65.1 CM/SEC
BH CV ECHO MEAS - MV DEC SLOPE: 394.3 CM/SEC2
BH CV ECHO MEAS - MV DEC TIME: 0.21 MSEC
BH CV ECHO MEAS - MV E MAX VEL: 67.3 CM/SEC
BH CV ECHO MEAS - MV E/A: 1.03
BH CV ECHO MEAS - MV MAX PG: 2.07 MMHG
BH CV ECHO MEAS - MV MEAN PG: 0.91 MMHG
BH CV ECHO MEAS - MV P1/2T: 56.6 MSEC
BH CV ECHO MEAS - MV V2 VTI: 21.9 CM
BH CV ECHO MEAS - MVA(P1/2T): 3.9 CM2
BH CV ECHO MEAS - MVA(VTI): 3.6 CM2
BH CV ECHO MEAS - PA ACC TIME: 0.1 SEC
BH CV ECHO MEAS - PA V2 MAX: 72.6 CM/SEC
BH CV ECHO MEAS - PULM DIAS VEL: 46.2 CM/SEC
BH CV ECHO MEAS - PULM S/D: 1.14
BH CV ECHO MEAS - PULM SYS VEL: 52.8 CM/SEC
BH CV ECHO MEAS - QP/QS: 0.39
BH CV ECHO MEAS - RAP SYSTOLE: 3 MMHG
BH CV ECHO MEAS - RV MAX PG: 1.36 MMHG
BH CV ECHO MEAS - RV V1 MAX: 58.3 CM/SEC
BH CV ECHO MEAS - RV V1 VTI: 10.8 CM
BH CV ECHO MEAS - RVOT DIAM: 1.92 CM
BH CV ECHO MEAS - RVSP: 26 MMHG
BH CV ECHO MEAS - SI(MOD-SP2): 22.9 ML/M2
BH CV ECHO MEAS - SI(MOD-SP4): 41.2 ML/M2
BH CV ECHO MEAS - SV(LVOT): 80.1 ML
BH CV ECHO MEAS - SV(MOD-SP2): 45 ML
BH CV ECHO MEAS - SV(MOD-SP4): 81 ML
BH CV ECHO MEAS - SV(RVOT): 31.2 ML
BH CV ECHO MEAS - TR MAX PG: 23.9 MMHG
BH CV ECHO MEAS - TR MAX VEL: 244.3 CM/SEC
BH CV ECHO MEASUREMENTS AVERAGE E/E' RATIO: 8.41
BH CV ECHO SHUNT ASSESSMENT PERFORMED (HIDDEN SCRIPTING): 1
BH CV XLRA - RV BASE: 3.8 CM
BH CV XLRA - RV LENGTH: 8.6 CM
BH CV XLRA - RV MID: 3.3 CM
BH CV XLRA - TDI S': 17.6 CM/SEC
BUN SERPL-MCNC: 20 MG/DL (ref 8–23)
BUN SERPL-MCNC: 20 MG/DL (ref 8–23)
BUN/CREAT SERPL: 15 (ref 7–25)
BUN/CREAT SERPL: 15.9 (ref 7–25)
CALCIUM SPEC-SCNC: 8.5 MG/DL (ref 8.6–10.5)
CALCIUM SPEC-SCNC: 8.7 MG/DL (ref 8.6–10.5)
CHLORIDE SERPL-SCNC: 107 MMOL/L (ref 98–107)
CHLORIDE SERPL-SCNC: 107 MMOL/L (ref 98–107)
CO2 SERPL-SCNC: 22 MMOL/L (ref 22–29)
CO2 SERPL-SCNC: 23.1 MMOL/L (ref 22–29)
CREAT SERPL-MCNC: 1.26 MG/DL (ref 0.76–1.27)
CREAT SERPL-MCNC: 1.33 MG/DL (ref 0.76–1.27)
DEPRECATED RDW RBC AUTO: 46.4 FL (ref 37–54)
EGFRCR SERPLBLD CKD-EPI 2021: 60.4 ML/MIN/1.73
EGFRCR SERPLBLD CKD-EPI 2021: 64.5 ML/MIN/1.73
ERYTHROCYTE [DISTWIDTH] IN BLOOD BY AUTOMATED COUNT: 15.2 % (ref 12.3–15.4)
GLUCOSE BLDC GLUCOMTR-MCNC: 143 MG/DL (ref 70–130)
GLUCOSE SERPL-MCNC: 120 MG/DL (ref 65–99)
GLUCOSE SERPL-MCNC: 131 MG/DL (ref 65–99)
HCT VFR BLD AUTO: 33.1 % (ref 37.5–51)
HGB BLD-MCNC: 10.6 G/DL (ref 13–17.7)
LEFT ATRIUM VOLUME INDEX: 17.4 ML/M2
MAGNESIUM SERPL-MCNC: 1.9 MG/DL (ref 1.6–2.4)
MCH RBC QN AUTO: 26.6 PG (ref 26.6–33)
MCHC RBC AUTO-ENTMCNC: 32 G/DL (ref 31.5–35.7)
MCV RBC AUTO: 83.2 FL (ref 79–97)
PHOSPHATE SERPL-MCNC: 2.2 MG/DL (ref 2.5–4.5)
PHOSPHATE SERPL-MCNC: 2.4 MG/DL (ref 2.5–4.5)
PLATELET # BLD AUTO: 209 10*3/MM3 (ref 140–450)
PMV BLD AUTO: 10.1 FL (ref 6–12)
POTASSIUM SERPL-SCNC: 3.6 MMOL/L (ref 3.5–5.2)
POTASSIUM SERPL-SCNC: 4.3 MMOL/L (ref 3.5–5.2)
RBC # BLD AUTO: 3.98 10*6/MM3 (ref 4.14–5.8)
SINUS: 3 CM
SODIUM SERPL-SCNC: 138 MMOL/L (ref 136–145)
SODIUM SERPL-SCNC: 140 MMOL/L (ref 136–145)
T3FREE SERPL-MCNC: 2.64 PG/ML (ref 2–4.4)
T4 FREE SERPL-MCNC: 0.98 NG/DL (ref 0.93–1.7)
WBC NRBC COR # BLD: 5.51 10*3/MM3 (ref 3.4–10.8)

## 2023-08-04 PROCEDURE — 84481 FREE ASSAY (FT-3): CPT | Performed by: HOSPITALIST

## 2023-08-04 PROCEDURE — 85027 COMPLETE CBC AUTOMATED: CPT | Performed by: STUDENT IN AN ORGANIZED HEALTH CARE EDUCATION/TRAINING PROGRAM

## 2023-08-04 PROCEDURE — 25010000002 LEVETRIRACETAM PER 10 MG: Performed by: PSYCHIATRY & NEUROLOGY

## 2023-08-04 PROCEDURE — 84439 ASSAY OF FREE THYROXINE: CPT | Performed by: HOSPITALIST

## 2023-08-04 PROCEDURE — 25510000001 PERFLUTREN (DEFINITY) 8.476 MG IN SODIUM CHLORIDE (PF) 0.9 % 10 ML INJECTION: Performed by: NURSE PRACTITIONER

## 2023-08-04 PROCEDURE — 93306 TTE W/DOPPLER COMPLETE: CPT | Performed by: INTERNAL MEDICINE

## 2023-08-04 PROCEDURE — 99233 SBSQ HOSP IP/OBS HIGH 50: CPT | Performed by: NURSE PRACTITIONER

## 2023-08-04 PROCEDURE — 82948 REAGENT STRIP/BLOOD GLUCOSE: CPT

## 2023-08-04 PROCEDURE — 80069 RENAL FUNCTION PANEL: CPT | Performed by: HOSPITALIST

## 2023-08-04 PROCEDURE — 84100 ASSAY OF PHOSPHORUS: CPT | Performed by: STUDENT IN AN ORGANIZED HEALTH CARE EDUCATION/TRAINING PROGRAM

## 2023-08-04 PROCEDURE — 93306 TTE W/DOPPLER COMPLETE: CPT

## 2023-08-04 PROCEDURE — 83735 ASSAY OF MAGNESIUM: CPT | Performed by: STUDENT IN AN ORGANIZED HEALTH CARE EDUCATION/TRAINING PROGRAM

## 2023-08-04 PROCEDURE — 80048 BASIC METABOLIC PNL TOTAL CA: CPT | Performed by: STUDENT IN AN ORGANIZED HEALTH CARE EDUCATION/TRAINING PROGRAM

## 2023-08-04 RX ORDER — ASPIRIN 81 MG/1
81 TABLET ORAL DAILY
Status: DISCONTINUED | OUTPATIENT
Start: 2023-08-05 | End: 2023-08-04

## 2023-08-04 RX ADMIN — NICOTINE 1 PATCH: 21 PATCH, EXTENDED RELEASE TRANSDERMAL at 09:57

## 2023-08-04 RX ADMIN — LEVETIRACETAM 1000 MG: 100 INJECTION INTRAVENOUS at 20:39

## 2023-08-04 RX ADMIN — PERFLUTREN 2 ML: 6.52 INJECTION, SUSPENSION INTRAVENOUS at 09:25

## 2023-08-04 RX ADMIN — ATORVASTATIN CALCIUM 80 MG: 80 TABLET, FILM COATED ORAL at 20:39

## 2023-08-04 RX ADMIN — FAMOTIDINE 20 MG: 20 TABLET, FILM COATED ORAL at 16:43

## 2023-08-04 RX ADMIN — SENNOSIDES AND DOCUSATE SODIUM 2 TABLET: 50; 8.6 TABLET ORAL at 20:39

## 2023-08-04 RX ADMIN — FAMOTIDINE 20 MG: 20 TABLET, FILM COATED ORAL at 06:33

## 2023-08-04 RX ADMIN — ASPIRIN 325 MG: 325 TABLET ORAL at 09:58

## 2023-08-04 RX ADMIN — SENNOSIDES AND DOCUSATE SODIUM 2 TABLET: 50; 8.6 TABLET ORAL at 09:58

## 2023-08-04 RX ADMIN — LEVETIRACETAM 1000 MG: 100 INJECTION INTRAVENOUS at 09:58

## 2023-08-04 NOTE — PLAN OF CARE
Goal Outcome Evaluation:       (Ipad) used for communication with patient, meds admin, NIH completed. Pt ambulated in lux, steady gait noted. Appetite good, NAD noted, denies pain, will ctm.

## 2023-08-04 NOTE — PROGRESS NOTES
"DOS: 2023  NAME: Winston Salvador   : 1960  PCP: Provider, No Known  Chief Complaint   Patient presents with    Altered Mental Status     Difficulty speaking since yesterday    Patient seen in follow-up today; new to me    service used-primary language Sets[place]    Neurology      Subjective: No acute events overnight.  Patient denies any new complaints and or concerns on my exam.    No family at bedside.     Objective:  Vital signs: /72   Pulse 81   Temp 98.2 øF (36.8 øC) (Oral)   Resp 16   Ht 170 cm (66.93\")   Wt 85 kg (187 lb 6.3 oz)   SpO2 100%   BMI 29.41 kg/mý       HEENT: Normocephalic, atraumatic   COR: RRR  Resp: Even and unlabored  Extremities: Equal pulses, nondistal embolization    Neurological:   MS: AO. Language normal. No neglect. Higher integrative function normal  CN: II-XII normal  Motor: 5/5, normal tone  Sensory: Intact-to light touch  Coordination: Normal-finger-to-nose    Laboratory results:  Lab Results   Component Value Date    GLUCOSE 131 (H) 2023    CALCIUM 8.7 2023     2023    K 3.6 2023    CO2 23.1 2023     2023    BUN 20 2023    CREATININE 1.33 (H) 2023    EGFRIFAFRI 75 2021    EGFRIFNONA 62 2021    BCR 15.0 2023    ANIONGAP 7.9 2023     Lab Results   Component Value Date    WBC 5.99 2023    HGB 11.3 (L) 2023    HCT 34.4 (L) 2023    MCV 81.3 2023     2023     Lab Results   Component Value Date    CHOL 194 2023    CHOL 179 2021     Lab Results   Component Value Date    HDL 26 (L) 2023    HDL 28 (L) 2021     Lab Results   Component Value Date     (H) 2023     (H) 2021     Lab Results   Component Value Date    TRIG 239 (H) 2023    TRIG 186 (H) 2021         Lab 23  0512   HEMOGLOBIN A1C 6.50*      Review and interpretation of imaging:  MRI EXAMINATION OF BRAIN " WITHOUT CONTRAST:     HISTORY: Confused, expressive aphasia.     COMPARISON: CT angiogram 08/02/2023.     TECHNIQUE: A MRI examination of the brain was performed utilizing  sagittal T1, axial fusion, T1, T2, T2 FLAIR and susceptibility weighted  imaging.     FINDINGS: Encephalomalacia involving the right frontal lobe anteriorly  is appreciated. A lacunar infarct involving the lori to the right  midline is noted. Confluent increased signal intensity involving the  white matter of the cerebral hemispheres is appreciated bilaterally on  the axial T2 FLAIR sequence consistent with extensive small vessel  ischemic disease. Susceptibility weighted sequence demonstrates  innumerable areas of signal loss involving the cerebral hemispheres  bilaterally including involvement of the thalamic nuclei bilaterally as  well as involving the lori centrally and to the right of midline. The  MRI examination of 09/07/2021 demonstrated an area of hemorrhage  involving the left parietal lobe inferolaterally extending to and  involving the posterior and superior aspect of the left temporal lobe.  On the current examination there is signal loss appreciated on the  susceptibility weighted sequence consistent with hemosiderin deposition  measuring approximately 17 mm x 10 mm in transverse dimension. There is  no evidence of positive mass effect.     There is expected flow-void in the basilar artery and in the distal  aspect of the internal carotid arteries bilaterally on the axial T2  sequence.     IMPRESSION:  The sensitivity of the study is reduced as intravenous  contrast was not utilized. However, there is no evidence of restricted  diffusion to suggest acute infarction or abscess. Innumerable areas of  signal loss are appreciated involving the cerebral hemispheres  bilaterally suggesting amyloid angiopathy and/or microhemorrhages. This  is better demonstrated on the current examination which included  susceptibility weighted imaging.  There is an area of hemosiderin  measuring 17 x 10 mm in transverse dimension which corresponds to an  area of hemorrhage involving the parietotemporal region on the left  laterally noted on the MRI examination 09/07/2021. Confluent T2 FLAIR  hyperintensity is appreciated involving the white matter cerebral  hemispheres bilaterally similar in appearance as compared to the MRI  examination of 09/07/2021. There is no evidence of acute hemorrhage or  of hydrocephalus.     This report was finalized on 8/3/2023 4:31 PM by Dr. Deo Demarco M.D.       CT ANGIOGRAM NECK AND HEAD WITH CONTRAST     HISTORY: Confusion, expressive aphasia, seizure.     COMPARISON: CT angiogram neck and head 09/07/2021 and MRI brain  09/07/2021.     Initially, a noncontrasted CT examination of the brain was performed.  Encephalomalacia is appreciated involving the right frontal lobe  anteriorly measuring approximately 2.5 cm in the transverse dimension.  This was present previously. Confluent areas of decreased attenuation  are appreciated involving the white matter of the cerebral hemispheres  bilaterally consistent with extensive small vessel ischemic disease.  There is no evidence of hemorrhage or of a focal area of decreased  attenuation to suggest acute infarction. The prior CT examination of  09/07/2021 demonstrated a peripherally-enhancing lesion involving the  left parietal lobe laterally measuring approximately 17 mm in the  transverse dimension. This is not appreciated on the current  examination.     A CT angiogram of the neck and head was then performed. Multiplanar as  well as 3-dimensional reconstructions were generated.     FINDINGS: The great vessels are arranged in a classic configuration.  There is 0% stenosis of the internal carotid arteries using NASCET  criteria. The right A1 segment is hypoplastic. Otherwise, the proximal  aspects of the anterior and middle cerebral arteries appear  unremarkable.     The vertebral  arteries are codominant. The left vertebral artery arises  from the aortic arch. The cervical segments are of relatively uniform  caliber. The distal aspect of the right vertebral artery is hypoplastic.  The basilar artery demonstrates a fenestration proximally. Otherwise,  the basilar artery appears unremarkable. There is a fetal origin of the  posterior cerebral arteries bilaterally. The P1 segments are  hypoplastic.     A standard postcontrast CT examination of the brain showed no evidence  of abnormal enhancement.     IMPRESSION:  1.  Encephalomalacia involving the right frontal lobe anteriorly is  noted, unchanged. There is extensive small vessel ischemic disease.  2.  The prior CT angiogram of 09/21/2021 demonstrated a  peripherally-enhancing lesion involving the left parietal lobe laterally  measuring approximately 17 mm in size. This is not appreciated on the  current examination. Further evaluation could be performed with an MRI  examination of the brain with and without contrast.  3.  There is 0% stenosis involving the carotid bifurcations using NASCET  criteria. There is no evidence of a proximal intracranial high-grade  stenosis or occlusion. There is an isolated posterior circulation. The  P1 segments are hypoplastic bilaterally. The distal aspect of the right  vertebral artery is hypoplastic.                 Radiation dose reduction techniques were utilized, including automated  exposure control and exposure modulation based on body size.     This report was finalized on 8/3/2023 7:09 AM by Dr. Deo Demarco M.D.       XR CHEST 1 VW-     HISTORY: Male who is 63 years-old, stroke     TECHNIQUE: Frontal view of the chest     COMPARISON: 9/7/2021     FINDINGS: The heart size is borderline. Aorta is tortuous. Pulmonary  vasculature is unremarkable. No focal pulmonary consolidation, pleural  effusion, or pneumothorax. No acute osseous process.     IMPRESSION:  No focal pulmonary consolidation. Borderline  heart size.  Tortuous aorta. Follow-up as clinically indicated.     This report was finalized on 8/2/2023 2:36 PM by Dr. Tray Durán M.D.    Date of onset: 8/3/2023  Date of offset: 8/3/2023     Indication: 63 year old man with seizure.  EEG for further evaluation.       Technical description:  This is a 21 channel digital EEG recording that began on 1323 and ended on 1349.  Electrodes were placed based on the 10-20 international electrode placement system.        Background:  The EEG recording demonstrates normal background activity with mainly alpha frequencies with intermixed theta frequencies.  9 Hz frequencies are present posteriorly and symmetrically.  The patient enters both stage 1 and 2 sleep with architecture present including vertex sharp transients and sleep spindles.  Hyperventilation was not performed but photic stimulation was performed with no additional abnormalities noted.  There are no asymmetries between the two hemispheres.  No interictal activity is present.     The EKG monitor shows a heart rate that is around 70 beats per minute.     Clinical interpretation:  This routine awake and sleep EEG is normal.  No potentially epileptogenic activity, seizure activity, or focal slowing is present.  Careful clinical correlation is advised.              Impression: This is a 63-year-old  male with prior history of stroke, TBI, seizure, CAITLIN, tobacco abuse and gout who presented to [facility] 8/2 due to difficulty speaking/childlike behavior/confusion/altered mental status/hallucination/repetitive speech for which our service was consulted.  Prior to arrival patient was supposed to be taking Keppra which he admitted he was noncompliant with.  Dose prior to arrival ordered 1 g twice daily.  Etiology of presentation felt to be secondary to breakthrough seizure.  CTA of the head and neck revealed evidence of encephalomalacia in the right frontal lobe-stable.  No evidence of  high-grade stenosis/aneurysm and/or dissection.  MRI of the brain shows no areas of restricted diffusion to suggest acute infarct and/or abscess.  Innumerable areas of signal loss suggestive of amyloid angiopathy and/or micro hemorrhages (see imaging above)-would advise against antiplatelets due to risk of hemorrhage (per attending neurologist Dr. Bryson).  Continue statin for secondary stroke prevention.  EEG read as normal without evidence of epileptiform discharges and/or seizure like activity.  TTE shows EF 56.7%.  LV normal.  LA normal.  Saline test negative.  Labs: A1c 6.5, , TSH elevated (5.9)-primary team managing.  B12 792, folate 15.50, mag 1.9, Phos 2.4.      Neurologically, stable at neurologic baseline.  Recommendations below. No further neurology workup indicated. We will sign off and see again upon request.      Diagnosis:  1.  Altered mental status felt to be representative of breakthrough seizures in setting of AED noncompliance-recommended resuming Keppra 1 g twice daily per prior recommendation  2.  History of stroke although evidence of multiple areas of signal loss noted on SWI concerning for amyloid angiopathy versus microhemorrhages therefore advised against antiplatelet/anticoagulation due to high risk for hemorrhage.  Would recommend continuing high-dose statin for secondary stroke prevention along with smoking cessation/cholesterol and blood sugar control-consider outpatient DUSTIN evaluation  3.  Tobacco abuse    Plan:  Advised against use of antiplatelet and/or anticoagulation as noted above  Keppra 1 g twice daily  Seizure precautions discussed with patient; specifically [place] law regarding driving for 90 days reviewed-patient verbalizes understanding and agrees to comply  Seizure Precautions: Patient is not to drive for at least 3 months until cleared by a physician, operate heavy machinery, take tub baths, swim unattended, climb heights, or perform any other activities that can  bring harm to himself/herself or others during an episode of altered awareness.    Case reviewed with attending neurologist  and she agrees with treatment plan above  S/o Plan discussed with ADONAY Ruano

## 2023-08-04 NOTE — SIGNIFICANT NOTE
08/04/23 0945   OTHER   Discipline physical therapist   Rehab Time/Intention   Session Not Performed other (see comments)  (Per family and OT, pt at baseline mobility level; no need for skilled PT services. Nurse aware and in agreement. Will sign off.)   Therapy Assessment/Plan (PT)   Criteria for Skilled Interventions Met (PT) no problems identified which require skilled intervention

## 2023-08-04 NOTE — PROGRESS NOTES
Name: Winston Salvador ADMIT: 2023   : 1960  PCP: Provider, No Known    MRN: [id number] LOS: 2 days   AGE/SEX: 63 y.o. male  ROOM: [facility]     Subjective   Subjective   Patient seen today and  used for conversation.  States he is overall feeling better, but still feeling somewhat weak.  Also endorsing an abnormal sensation into his right hand.  Denies numbness but states he cannot feel it when he goes to tie his gown.  He is otherwise doing okay.  Has been walking without issue.  Tolerating good oral intake.       Objective   Objective     Vital Signs  Temp:  [98.2 øF (36.8 øC)-98.5 øF (36.9 øC)] 98.2 øF (36.8 øC)  Heart Rate:  [75-85] 81  Resp:  [16] 16  BP: (109-142)/(72-99) 135/72  SpO2:  [96 %-100 %] 100 %  on   ;   Device (Oxygen Therapy): room air  Body mass index is 29.41 kg/mý.    Physical Exam  General: Alert, no acute distress.  ENT: No conjunctival injection or scleral icterus. Moist mucous membranes. No JVD.   Neuro: Eyes open and moving in all directions, strength 5 out of 5 in all extremities, no sensory deficits.  Cranial nerves are intact.  Patient able to stand and walk without issue.  Lungs: Clear to auscultation bilaterally. No wheeze or crackles. No distress.   Heart: RRR, no murmurs. No edema.  Abdomen: Soft, non-tender, non-distended. Normal bowel sounds. No hepatosplenomegaly.   Ext: Warm and well-perfused. No edema.   Skin: No rashes or lesions. IV site without swelling or erythema.     Results Review     I reviewed the patient's new clinical results:  Results from last 7 days   Lab Units 23  1041 23  0512 23  1402   WBC 10*3/mm3 5.51 5.99 6.23   HEMOGLOBIN g/dL 10.6* 11.3* 13.0   PLATELETS 10*3/mm3 209 212 246     Results from last 7 days   Lab Units 23  1041 23  0549 23  0512 23  1402   SODIUM mmol/L 140 138 139 136   POTASSIUM mmol/L 4.3 3.6 3.5 3.6   CHLORIDE mmol/L 107 107 106 101   CO2 mmol/L 22.0 23.1 22.7  24.2   BUN mg/dL 20 20 18 21   CREATININE mg/dL 1.26 1.33* 1.32* 1.63*   GLUCOSE mg/dL 120* 131* 154* 159*   EGFR mL/min/1.73 64.5 60.4 61.0 47.3*     Results from last 7 days   Lab Units 08/04/23  0549 08/03/23  0512 08/02/23  1402   ALBUMIN g/dL 3.3* 3.4* 4.7   BILIRUBIN mg/dL  --  <0.2 0.5   ALK PHOS U/L  --  71 87   AST (SGOT) U/L  --  22 24   ALT (SGPT) U/L  --  15 16     Results from last 7 days   Lab Units 08/04/23  1041 08/04/23  0549 08/03/23  0512 08/02/23  1402   CALCIUM mg/dL 8.5* 8.7 8.6 9.6   ALBUMIN g/dL  --  3.3* 3.4* 4.7   MAGNESIUM mg/dL  --  1.9  --   --    PHOSPHORUS mg/dL  --  2.4*  2.2*  --   --      Results from last 7 days   Lab Units 08/02/23  1419   LACTATE mmol/L 1.0     Hemoglobin A1C   Date/Time Value Ref Range Status   08/03/2023 0512 6.50 (H) 4.80 - 5.60 % Final     Glucose   Date/Time Value Ref Range Status   08/04/2023 0605 143 (H) 70 - 130 mg/dL Final     Comment:     Meter: [id number] : [id number] Leonel Carey PCA   08/03/2023 2341 133 (H) 70 - 130 mg/dL Final     Comment:     Meter: [id number] : [id number] Leonel Carey PCA   08/03/2023 1646 140 (H) 70 - 130 mg/dL Final     Comment:     Meter: [id number] : [id number] Aliyah NAPOLES   08/03/2023 1037 105 70 - 130 mg/dL Final     Comment:     Meter: [id number] : [id number] Aliyah NAPOLES       CT Angiogram Neck    Result Date: 8/3/2023  1.  Encephalomalacia involving the right frontal lobe anteriorly is noted, unchanged. There is extensive small vessel ischemic disease. 2.  The prior CT angiogram of 09/21/2021 demonstrated a peripherally-enhancing lesion involving the left parietal lobe laterally measuring approximately 17 mm in size. This is not appreciated on the current examination. Further evaluation could be performed with an MRI examination of the brain with and without contrast. 3.  There is 0% stenosis involving the carotid bifurcations using NASCET criteria. There is no evidence of a proximal intracranial  high-grade stenosis or occlusion. There is an isolated posterior circulation. The P1 segments are hypoplastic bilaterally. The distal aspect of the right vertebral artery is hypoplastic.      Radiation dose reduction techniques were utilized, including automated exposure control and exposure modulation based on body size.  This report was finalized on 8/3/2023 7:09 AM by Dr. Deo Demarco M.D.      MRI Brain Without Contrast    Result Date: 8/3/2023  The sensitivity of the study is reduced as intravenous contrast was not utilized. However, there is no evidence of restricted diffusion to suggest acute infarction or abscess. Innumerable areas of signal loss are appreciated involving the cerebral hemispheres bilaterally suggesting amyloid angiopathy and/or microhemorrhages. This is better demonstrated on the current examination which included susceptibility weighted imaging. There is an area of hemosiderin measuring 17 x 10 mm in transverse dimension which corresponds to an area of hemorrhage involving the parietotemporal region on the left laterally noted on the MRI examination 09/07/2021. Confluent T2 FLAIR hyperintensity is appreciated involving the white matter cerebral hemispheres bilaterally similar in appearance as compared to the MRI examination of 09/07/2021. There is no evidence of acute hemorrhage or of hydrocephalus.  This report was finalized on 8/3/2023 4:31 PM by Dr. Deo Demarco M.D.      XR Chest 1 View    Result Date: 8/2/2023  No focal pulmonary consolidation. Borderline heart size. Tortuous aorta. Follow-up as clinically indicated.  This report was finalized on 8/2/2023 2:36 PM by Dr. Tray Durán M.D.      CT Angiogram Head    Result Date: 8/3/2023  1.  Encephalomalacia involving the right frontal lobe anteriorly is noted, unchanged. There is extensive small vessel ischemic disease. 2.  The prior CT angiogram of 09/21/2021 demonstrated a peripherally-enhancing lesion involving the left  parietal lobe laterally measuring approximately 17 mm in size. This is not appreciated on the current examination. Further evaluation could be performed with an MRI examination of the brain with and without contrast. 3.  There is 0% stenosis involving the carotid bifurcations using NASCET criteria. There is no evidence of a proximal intracranial high-grade stenosis or occlusion. There is an isolated posterior circulation. The P1 segments are hypoplastic bilaterally. The distal aspect of the right vertebral artery is hypoplastic.      Radiation dose reduction techniques were utilized, including automated exposure control and exposure modulation based on body size.  This report was finalized on 8/3/2023 7:09 AM by Dr. Deo Demarco M.D.       I have personally reviewed all medications:  Scheduled Medications  aspirin, 325 mg, Oral, Daily   Or  aspirin, 300 mg, Rectal, Daily  atorvastatin, 80 mg, Oral, Nightly  famotidine, 20 mg, Oral, BID AC  levETIRAcetam, 1,000 mg, Intravenous, Q12H  nicotine, 1 patch, Transdermal, Q24H  senna-docusate sodium, 2 tablet, Oral, BID    Infusions  sodium chloride, 75 mL/hr, Last Rate: 75 mL/hr (08/03/23 2118)    Diet  Diet: Regular/House Diet, Diabetic Diets; Consistent Carbohydrate; Texture: Regular Texture (IDDSI 7); Fluid Consistency: Thin (IDDSI 0)    I have personally reviewed:  [x]  Laboratory   [x]  Microbiology   [x]  Radiology   [x]  EKG/Telemetry  [x]  Cardiology/Vascular   []  Pathology    []  Records         Assessment/Plan     Active Hospital Problems    Diagnosis  POA    **Altered mental status [R41.82]  Yes    Non-traumatic rhabdomyolysis [M62.82]  Yes    History of stroke [Z86.73]  Not Applicable    History of seizure [Z87.898]  Yes    CAITLIN (acute kidney injury) [N17.9]  Yes      Resolved Hospital Problems   No resolved problems to display.       63 y.o. male with Altered mental status.    Overall patient has had improvement in his neurologic and mental status.  There  are no focal neurologic deficits on exam.  Still awaiting evaluation from neurology today.  Patient states that he is overall feeling better, and feels like he will be ready to go home tomorrow.  Will discuss with neurology.  Anticipate discharge of this patient tomorrow.    Altered mental status  -CT angio shows encephalomalacia along the right frontal lobe that is unchanged.  -Neurology consulted and following  -MRI completed and remarkable for numerous areas of signal loss involving the cerebral hemispheres bilaterally that could suggest amyloid angiopathy or microhemorrhages; no evidence of acute hemorrhage or hydrocephalus  -EEG completed and normal, no epileptiform activity  -Echo has been obtained with read pending  -Continue ASA and statin; will discuss with neurology the need to continue these on discharge  -Patient on Keppra previously but had not been taking, this has been restarted    Mild CAITLIN  Nontraumatic mild rhabdomyolysis  -Creatinine improved slightly  -We will continue IV fluids overnight  -Avoid nephrotoxic agents  -Check labs in morning     History of focal seizures with Nilton's paralysis  -Continue Keppra twice daily  -Seizure precautions     Tobacco abuse  -Family reports history of heavy smoking.    -Nicotine patch daily  -Encourage cessation    Sulema Ayers MD  [facility]  08/04/23  13:50 EDT

## 2023-08-05 ENCOUNTER — READMISSION MANAGEMENT (OUTPATIENT)
Dept: CALL CENTER | Facility: HOSPITAL | Age: 63
End: 2023-08-05

## 2023-08-05 VITALS
HEART RATE: 73 BPM | WEIGHT: 187.39 LBS | TEMPERATURE: 98.7 F | BODY MASS INDEX: 29.41 KG/M2 | HEIGHT: 67 IN | RESPIRATION RATE: 20 BRPM | DIASTOLIC BLOOD PRESSURE: 84 MMHG | OXYGEN SATURATION: 99 % | SYSTOLIC BLOOD PRESSURE: 123 MMHG

## 2023-08-05 PROCEDURE — 25010000002 LEVETRIRACETAM PER 10 MG: Performed by: PSYCHIATRY & NEUROLOGY

## 2023-08-05 RX ORDER — FAMOTIDINE 20 MG/1
20 TABLET, FILM COATED ORAL
Qty: 180 TABLET | Refills: 0 | Status: SHIPPED | OUTPATIENT
Start: 2023-08-05 | End: 2023-11-03

## 2023-08-05 RX ORDER — LEVETIRACETAM 1000 MG/1
1000 TABLET ORAL EVERY 12 HOURS SCHEDULED
Qty: 180 TABLET | Refills: 0 | Status: SHIPPED | OUTPATIENT
Start: 2023-08-05 | End: 2023-11-03

## 2023-08-05 RX ORDER — ATORVASTATIN CALCIUM 80 MG/1
80 TABLET, FILM COATED ORAL NIGHTLY
Qty: 90 TABLET | Refills: 0 | Status: SHIPPED | OUTPATIENT
Start: 2023-08-05

## 2023-08-05 RX ADMIN — LEVETIRACETAM 1000 MG: 100 INJECTION INTRAVENOUS at 10:14

## 2023-08-05 RX ADMIN — NICOTINE 1 PATCH: 21 PATCH, EXTENDED RELEASE TRANSDERMAL at 10:15

## 2023-08-05 RX ADMIN — SENNOSIDES AND DOCUSATE SODIUM 2 TABLET: 50; 8.6 TABLET ORAL at 10:15

## 2023-08-05 RX ADMIN — FAMOTIDINE 20 MG: 20 TABLET, FILM COATED ORAL at 10:14

## 2023-08-05 NOTE — DISCHARGE INSTRUCTIONS
[facility] ([address] [facility] in [place]) for health care     Seizure Precautions: Patient is not to drive for at least 3 months until cleared by a physician, operate heavy machinery, take tub baths, swim unattended, climb heights, or perform any other activities that can bring harm to himself/herself or others during an episode of altered awareness.

## 2023-08-05 NOTE — PLAN OF CARE
Problem: Adult Inpatient Plan of Care  Goal: Plan of Care Review   Goal Outcome Evaluation:  Plan of Care Reviewed With: patient        Progress: no change  Outcome Evaluation: Pt English speaking.  iPad used. NIH 1 due to numbness to RUE. Up ad jakub. Gait steady. Pt denies pain, n/v/d. Pt requesting note for work. Seizure precautions continued. Side rails padded. SCDs in place. Educated pt to call staff for assistance.

## 2023-08-05 NOTE — DISCHARGE SUMMARY
Patient Name: Winston Salvador  : 1960  MRN: [id number]    Date of Admission: 2023  Date of Discharge:  2023  Primary Care Physician: Provider, No Known      Chief Complaint:   Altered Mental Status (Difficulty speaking since yesterday )      Discharge Diagnoses     Active Hospital Problems    Diagnosis  POA    **Altered mental status [R41.82]  Yes    Non-traumatic rhabdomyolysis [M62.82]  Yes    History of stroke [Z86.73]  Not Applicable    History of seizure [Z87.898]  Yes    CAITLIN (acute kidney injury) [N17.9]  Yes      Resolved Hospital Problems   No resolved problems to display.        Hospital Course     Mr. Juice Salvador is a 63 y.o. male with a history of tobacco use, smoking, and focal seizure with Nilton's paralysis who presented to [facility] initially complaining of altered mental status.  Patient had become weak in bilateral lower extremities and repetitive in his speaking.  Was admitted for further work-up and management.    CT angiogram showed encephalomalacia along the right frontal lobe that is unchanged from prior.  Patient was admitted and MRI ordered.  Neurology was consulted.  CT angiogram of the neck revealed no stenosis involving the carotid bifurcation.  MRI brain was completed and did not reveal any acute abnormalities.  However there was significant signal loss in the bilateral cerebral hemispheres suggestive of amyloid angiopathy and/or microhemorrhages.  The patient was initially started on ASA and statin, due to concern for possible CVA.  However, with the concern for microhemorrhages, neurology elected to discontinue the ASA.  He should still continue the statin after discharge.  EEG was completed and normal revealing no epileptiform activity.  Patient was monitored while inpatient and had complete returned to his neurological baseline.  No seizures were noted.  The patient had not been taking his Keppra at home and this was  restarted.  Given his lack of health insurance, the patient was provided with 3 months of this medication prior to discharge home.  He was evaluated by physical therapy, and mobility was at baseline.  No need for further rehabilitation.  Given his presentation and concern for CVA, echocardiogram with bubble study was completed.  This was normal.  Patient had a calculated EF of 56%.    A  was used for all encounters with the patient.  He was updated on all laboratory and imaging results.  On day of discharge, patient felt that he had returned to his neurological baseline and was comfortable with discharge home.  He conveyed understanding the plan and his medications.  He should follow-up with neurology within 1 month.  Did also encourage patient to establish with a primary care provider, but this is challenging due to his lack of health insurance.  Case management worked with patient throughout his stay with regards to this.    Day of Discharge     Subjective:  No acute events overnight.  Patient up and awake walking around room.  States that he no longer has any numbness of his arm.  Feels that he has returned to baseline.  Is tolerating great oral intake.    Physical Exam:  Temp:  [98.1 øF (36.7 øC)-98.4 øF (36.9 øC)] 98.1 øF (36.7 øC)  Heart Rate:  [71-76] 71  Resp:  [16] 16  BP: (111-136)/(75-92) 124/92  Body mass index is 29.41 kg/mý.  Physical Exam  General: Alert, no acute distress.  ENT: No conjunctival injection or scleral icterus. Moist mucous membranes. No JVD.   Neuro: Eyes open and moving in all directions, strength 5 out of 5 in all extremities, no sensory deficits.  Cranial nerves are intact.  Patient able to stand and walk without issue.  Lungs: Clear to auscultation bilaterally. No wheeze or crackles. No distress.   Heart: RRR, no murmurs. No edema.  Abdomen: Soft, non-tender, non-distended. Normal bowel sounds. No hepatosplenomegaly.   Ext: Warm and well-perfused. No edema.    Skin: No rashes or lesions. IV site without swelling or erythema.     Consultants     Consult Orders (all) (From admission, onward)       Start     Ordered    08/03/23 0746  Inpatient Cardiology Consult  Once        Specialty:  Cardiology  Provider:  Danica Herman MD    08/03/23 0745    08/03/23 0702  Inpatient Neurology Consult General  IN AM,   Status:  Canceled        Specialty:  Neurology  Provider:  Riley Randolph MD    08/03/23 0027    08/03/23 0029  Notify Stroke Coordinator  Once,   Status:  Canceled        Provider:  (Not yet assigned)    08/03/23 0029    08/03/23 0029  Inpatient Rehab Admission Consult  Once,   Status:  Canceled        Provider:  (Not yet assigned)    08/03/23 0029 08/03/23 0029  Consult to Case Management   Once        Provider:  (Not yet assigned)    08/03/23 0029 08/03/23 0029  Consult to Diabetes Educator  Once,   Status:  Canceled        Provider:  (Not yet assigned)    08/03/23 0029    08/03/23 0029  Inpatient Neurology Consult Stroke  Once        Specialty:  Neurology  Provider:  Riley Randolph MD    08/03/23 0029                  Procedures     * Surgery not found *    Imaging Results (All)       Procedure Component Value Units Date/Time    MRI Brain Without Contrast [[id number]] Collected: 08/03/23 1031     Updated: 08/03/23 1634    Narrative:      MRI EXAMINATION OF BRAIN WITHOUT CONTRAST:     HISTORY: Confused, expressive aphasia.     COMPARISON: CT angiogram 08/02/2023.     TECHNIQUE: A MRI examination of the brain was performed utilizing  sagittal T1, axial fusion, T1, T2, T2 FLAIR and susceptibility weighted  imaging.     FINDINGS: Encephalomalacia involving the right frontal lobe anteriorly  is appreciated. A lacunar infarct involving the lori to the right  midline is noted. Confluent increased signal intensity involving the  white matter of the cerebral hemispheres is appreciated bilaterally on  the axial T2 FLAIR sequence  consistent with extensive small vessel  ischemic disease. Susceptibility weighted sequence demonstrates  innumerable areas of signal loss involving the cerebral hemispheres  bilaterally including involvement of the thalamic nuclei bilaterally as  well as involving the lori centrally and to the right of midline. The  MRI examination of 09/07/2021 demonstrated an area of hemorrhage  involving the left parietal lobe inferolaterally extending to and  involving the posterior and superior aspect of the left temporal lobe.  On the current examination there is signal loss appreciated on the  susceptibility weighted sequence consistent with hemosiderin deposition  measuring approximately 17 mm x 10 mm in transverse dimension. There is  no evidence of positive mass effect.     There is expected flow-void in the basilar artery and in the distal  aspect of the internal carotid arteries bilaterally on the axial T2  sequence.       Impression:      The sensitivity of the study is reduced as intravenous  contrast was not utilized. However, there is no evidence of restricted  diffusion to suggest acute infarction or abscess. Innumerable areas of  signal loss are appreciated involving the cerebral hemispheres  bilaterally suggesting amyloid angiopathy and/or microhemorrhages. This  is better demonstrated on the current examination which included  susceptibility weighted imaging. There is an area of hemosiderin  measuring 17 x 10 mm in transverse dimension which corresponds to an  area of hemorrhage involving the parietotemporal region on the left  laterally noted on the MRI examination 09/07/2021. Confluent T2 FLAIR  hyperintensity is appreciated involving the white matter cerebral  hemispheres bilaterally similar in appearance as compared to the MRI  examination of 09/07/2021. There is no evidence of acute hemorrhage or  of hydrocephalus.     This report was finalized on 8/3/2023 4:31 PM by Dr. Deo Demarco M.D.       CT  Angiogram Head [[id number]] Collected: 08/02/23 1531     Updated: 08/03/23 0712    Narrative:      CT ANGIOGRAM NECK AND HEAD WITH CONTRAST     HISTORY: Confusion, expressive aphasia, seizure.     COMPARISON: CT angiogram neck and head 09/07/2021 and MRI brain  09/07/2021.     Initially, a noncontrasted CT examination of the brain was performed.  Encephalomalacia is appreciated involving the right frontal lobe  anteriorly measuring approximately 2.5 cm in the transverse dimension.  This was present previously. Confluent areas of decreased attenuation  are appreciated involving the white matter of the cerebral hemispheres  bilaterally consistent with extensive small vessel ischemic disease.  There is no evidence of hemorrhage or of a focal area of decreased  attenuation to suggest acute infarction. The prior CT examination of  09/07/2021 demonstrated a peripherally-enhancing lesion involving the  left parietal lobe laterally measuring approximately 17 mm in the  transverse dimension. This is not appreciated on the current  examination.     A CT angiogram of the neck and head was then performed. Multiplanar as  well as 3-dimensional reconstructions were generated.     FINDINGS: The great vessels are arranged in a classic configuration.  There is 0% stenosis of the internal carotid arteries using NASCET  criteria. The right A1 segment is hypoplastic. Otherwise, the proximal  aspects of the anterior and middle cerebral arteries appear  unremarkable.     The vertebral arteries are codominant. The left vertebral artery arises  from the aortic arch. The cervical segments are of relatively uniform  caliber. The distal aspect of the right vertebral artery is hypoplastic.  The basilar artery demonstrates a fenestration proximally. Otherwise,  the basilar artery appears unremarkable. There is a fetal origin of the  posterior cerebral arteries bilaterally. The P1 segments are  hypoplastic.     A standard postcontrast CT  examination of the brain showed no evidence  of abnormal enhancement.       Impression:      1.  Encephalomalacia involving the right frontal lobe anteriorly is  noted, unchanged. There is extensive small vessel ischemic disease.  2.  The prior CT angiogram of 09/21/2021 demonstrated a  peripherally-enhancing lesion involving the left parietal lobe laterally  measuring approximately 17 mm in size. This is not appreciated on the  current examination. Further evaluation could be performed with an MRI  examination of the brain with and without contrast.  3.  There is 0% stenosis involving the carotid bifurcations using NASCET  criteria. There is no evidence of a proximal intracranial high-grade  stenosis or occlusion. There is an isolated posterior circulation. The  P1 segments are hypoplastic bilaterally. The distal aspect of the right  vertebral artery is hypoplastic.                 Radiation dose reduction techniques were utilized, including automated  exposure control and exposure modulation based on body size.     This report was finalized on 8/3/2023 7:09 AM by Dr. Deo Demarco M.D.       CT Angiogram Neck [[id number]] Collected: 08/02/23 1531     Updated: 08/03/23 0712    Narrative:      CT ANGIOGRAM NECK AND HEAD WITH CONTRAST     HISTORY: Confusion, expressive aphasia, seizure.     COMPARISON: CT angiogram neck and head 09/07/2021 and MRI brain  09/07/2021.     Initially, a noncontrasted CT examination of the brain was performed.  Encephalomalacia is appreciated involving the right frontal lobe  anteriorly measuring approximately 2.5 cm in the transverse dimension.  This was present previously. Confluent areas of decreased attenuation  are appreciated involving the white matter of the cerebral hemispheres  bilaterally consistent with extensive small vessel ischemic disease.  There is no evidence of hemorrhage or of a focal area of decreased  attenuation to suggest acute infarction. The prior CT examination  of  09/07/2021 demonstrated a peripherally-enhancing lesion involving the  left parietal lobe laterally measuring approximately 17 mm in the  transverse dimension. This is not appreciated on the current  examination.     A CT angiogram of the neck and head was then performed. Multiplanar as  well as 3-dimensional reconstructions were generated.     FINDINGS: The great vessels are arranged in a classic configuration.  There is 0% stenosis of the internal carotid arteries using NASCET  criteria. The right A1 segment is hypoplastic. Otherwise, the proximal  aspects of the anterior and middle cerebral arteries appear  unremarkable.     The vertebral arteries are codominant. The left vertebral artery arises  from the aortic arch. The cervical segments are of relatively uniform  caliber. The distal aspect of the right vertebral artery is hypoplastic.  The basilar artery demonstrates a fenestration proximally. Otherwise,  the basilar artery appears unremarkable. There is a fetal origin of the  posterior cerebral arteries bilaterally. The P1 segments are  hypoplastic.     A standard postcontrast CT examination of the brain showed no evidence  of abnormal enhancement.       Impression:      1.  Encephalomalacia involving the right frontal lobe anteriorly is  noted, unchanged. There is extensive small vessel ischemic disease.  2.  The prior CT angiogram of 09/21/2021 demonstrated a  peripherally-enhancing lesion involving the left parietal lobe laterally  measuring approximately 17 mm in size. This is not appreciated on the  current examination. Further evaluation could be performed with an MRI  examination of the brain with and without contrast.  3.  There is 0% stenosis involving the carotid bifurcations using NASCET  criteria. There is no evidence of a proximal intracranial high-grade  stenosis or occlusion. There is an isolated posterior circulation. The  P1 segments are hypoplastic bilaterally. The distal aspect of the  right  vertebral artery is hypoplastic.                 Radiation dose reduction techniques were utilized, including automated  exposure control and exposure modulation based on body size.     This report was finalized on 8/3/2023 7:09 AM by Dr. Deo Demarco M.D.       XR Chest 1 View [[id number]] Collected: 08/02/23 1435     Updated: 08/02/23 1439    Narrative:      XR CHEST 1 VW-     HISTORY: Male who is 63 years-old, stroke     TECHNIQUE: Frontal view of the chest     COMPARISON: 9/7/2021     FINDINGS: The heart size is borderline. Aorta is tortuous. Pulmonary  vasculature is unremarkable. No focal pulmonary consolidation, pleural  effusion, or pneumothorax. No acute osseous process.       Impression:      No focal pulmonary consolidation. Borderline heart size.  Tortuous aorta. Follow-up as clinically indicated.     This report was finalized on 8/2/2023 2:36 PM by Dr. Tray Durán M.D.               Results for orders placed during the hospital encounter of 08/02/23    Adult transthoracic echo complete    Interpretation Summary    Left ventricular systolic function is normal. Calculated left ventricular EF = 56.7%    Left ventricular diastolic function was normal.    Saline test results are negative.    Estimated right ventricular systolic pressure from tricuspid regurgitation is normal (<35 mmHg). Calculated right ventricular systolic pressure from tricuspid regurgitation is 26 mmHg.    Pertinent Labs     Results from last 7 days   Lab Units 08/04/23  1041 08/03/23  0512 08/02/23  1402   WBC 10*3/mm3 5.51 5.99 6.23   HEMOGLOBIN g/dL 10.6* 11.3* 13.0   PLATELETS 10*3/mm3 209 212 246     Results from last 7 days   Lab Units 08/04/23  1041 08/04/23  0549 08/03/23  0512 08/02/23  1402   SODIUM mmol/L 140 138 139 136   POTASSIUM mmol/L 4.3 3.6 3.5 3.6   CHLORIDE mmol/L 107 107 106 101   CO2 mmol/L 22.0 23.1 22.7 24.2   BUN mg/dL 20 20 18 21   CREATININE mg/dL 1.26 1.33* 1.32* 1.63*   GLUCOSE mg/dL 120*  131* 154* 159*   EGFR mL/min/1.73 64.5 60.4 61.0 47.3*     Results from last 7 days   Lab Units 08/04/23  0549 08/03/23  0512 08/02/23  1402   ALBUMIN g/dL 3.3* 3.4* 4.7   BILIRUBIN mg/dL  --  <0.2 0.5   ALK PHOS U/L  --  71 87   AST (SGOT) U/L  --  22 24   ALT (SGPT) U/L  --  15 16     Results from last 7 days   Lab Units 08/04/23  1041 08/04/23  0549 08/03/23  0512 08/02/23  1402   CALCIUM mg/dL 8.5* 8.7 8.6 9.6   ALBUMIN g/dL  --  3.3* 3.4* 4.7   MAGNESIUM mg/dL  --  1.9  --   --    PHOSPHORUS mg/dL  --  2.4*  2.2*  --   --      Results from last 7 days   Lab Units 08/02/23  1419   AMMONIA umol/L 36     Results from last 7 days   Lab Units 08/02/23  1402   CK TOTAL U/L 492*   HSTROP T ng/L 8       Results from last 7 days   Lab Units 08/03/23  0512   CHOLESTEROL mg/dL 194   TRIGLYCERIDES mg/dL 239*   HDL CHOL mg/dL 26*   LDL CHOL mg/dL 125*             Test Results Pending at Discharge     Pending Labs       Order Current Status    Levetiracetam Level (Keppra) In process          Discharge Details        Discharge Medications        New Medications        Instructions Start Date   atorvastatin 80 MG tablet  Commonly known as: LIPITOR   80 mg, Oral, Nightly      famotidine 20 MG tablet  Commonly known as: PEPCID   20 mg, Oral, 2 Times Daily Before Meals             Continue These Medications        Instructions Start Date   ibuprofen 800 MG tablet  Commonly known as: ADVIL,MOTRIN   800 mg, Oral, Every 8 Hours PRN      levETIRAcetam 1000 MG tablet  Commonly known as: KEPPRA   1,000 mg, Oral, Every 12 Hours Scheduled             No Known Allergies    Discharge Disposition:  Home or Self Care    Discharge Diet:  Diet Order   Procedures    Diet: Regular/House Diet, Diabetic Diets; Consistent Carbohydrate; Texture: Regular Texture (IDDSI 7); Fluid Consistency: Thin (IDDSI 0)       Discharge Activity: Activity as tolerated      CODE STATUS:    Code Status and Medical Interventions:   Ordered at: 08/03/23 0027     Code  Status (Patient has no pulse and is not breathing):    CPR (Attempt to Resuscitate)     Medical Interventions (Patient has pulse or is breathing):    Full Support     Release to patient:    Routine Release       Future Appointments   Date Time Provider Department Center   11/21/2023 11:15 AM Kendall Headley MD [facility]      Follow-up Information       Caitlin Wright APRN Follow up in 1 month(s).    Specialties: Neurology, Nurse Practitioner, Emergency Medicine  Contact information:  [address] [address] [phone]               Provider, No Known Follow up in 3 day(s).    Contact information:  [facility] [address] [phone]                             Time Spent on Discharge:  Greater than 30 minutes      Sulema Ayers MD  [facility]  08/05/23  12:57 EDT

## 2023-08-06 LAB — LEVETIRACETAM SERPL-MCNC: <2 UG/ML (ref 10–40)

## 2023-08-09 ENCOUNTER — READMISSION MANAGEMENT (OUTPATIENT)
Dept: CALL CENTER | Facility: HOSPITAL | Age: 63
End: 2023-08-09

## 2023-08-09 NOTE — OUTREACH NOTE
Medical Week 1 Survey      Flowsheet Row Responses   [facility] patient discharged from? [place]   Does the patient have one of the following disease processes/diagnoses(primary or secondary)? Other   Week 1 attempt successful? No   Unsuccessful attempts Attempt 1            NAILA BROWN - Registered Nurse

## 2023-08-16 ENCOUNTER — READMISSION MANAGEMENT (OUTPATIENT)
Dept: CALL CENTER | Facility: HOSPITAL | Age: 63
End: 2023-08-16

## 2023-08-16 NOTE — OUTREACH NOTE
Medical Week 2 Survey      Flowsheet Row Responses   [facility] patient discharged from? [place]   Does the patient have one of the following disease processes/diagnoses(primary or secondary)? Other   Week 2 attempt successful? No   Unsuccessful attempts Attempt 1            HEATH PEREZ - Registered Nurse

## 2023-08-17 NOTE — PROGRESS NOTES
"Enter Query Response Below      Query Response: Breakthrough seizures due to underdosing of po Keppra              If applicable, please update the problem list.   Patient: Winston Arambula        : 1960  Account: [id number]           Admit Date: 2023        How to Respond to this query:       a. Click New Note     b. Answer query within the yellow box.                c. Update the Problem List, if applicable.      If you have any questions about this query contact me at: [email or web address]  [phone]      Dr. Ayers:     63 y M with a noted history of Seizures, Nilton's paralysis, and CVA was admitted with altered mental status, non-traumatic rhabdomyolysis, and CAITLIN.  Neurology noted, \"Altered mental status felt to be representative of breakthrough seizures in setting of AED noncompliance- recommended resuming Keppra 1 g twice daily per prior recommendation.\" Hospital course noted, \"EEG was completed and normal revealing no epileptiform activity.  Patient was monitored while inpatient and had complete returned to his neurological baseline.  No seizures were noted.  The patient had not been taking his Keppra at home and this was restarted.\"    Please clarify the following:    Breakthrough seizures due to underdosing of po Keppra   Encephalopathy due to underdosing of po Keppra   Other- specify______  Unable to determine    By submitting this query, we are merely seeking further clarification of documentation to accurately reflect all conditions that you are monitoring, evaluating, treating or that extend the hospitalization or utilize additional resources of care. Please utilize your independent clinical judgment when addressing the question(s) above.     This query and your response, once completed, will be entered into the legal medical record.    Sincerely,    CALIXTO Stern, RN, CCDS  Clinical Documentation Integrity Program     "

## 2023-08-22 ENCOUNTER — READMISSION MANAGEMENT (OUTPATIENT)
Dept: CALL CENTER | Facility: HOSPITAL | Age: 63
End: 2023-08-22

## 2023-08-22 NOTE — OUTREACH NOTE
Medical Week 3 Survey      Flowsheet Row Responses   [facility] patient discharged from? [place]   Does the patient have one of the following disease processes/diagnoses(primary or secondary)? Other   Week 3 attempt successful? No   Unsuccessful attempts Attempt 1            Kaylee Cortes Registered Nurse